# Patient Record
Sex: MALE | Race: WHITE | NOT HISPANIC OR LATINO | Employment: OTHER | URBAN - METROPOLITAN AREA
[De-identification: names, ages, dates, MRNs, and addresses within clinical notes are randomized per-mention and may not be internally consistent; named-entity substitution may affect disease eponyms.]

---

## 2017-01-03 ENCOUNTER — VIRTUAL VISIT (OUTPATIENT)
Dept: EDUCATION SERVICES | Facility: CLINIC | Age: 52
End: 2017-01-03

## 2017-01-03 DIAGNOSIS — E10.9 TYPE 1 DIABETES MELLITUS WITHOUT COMPLICATION (H): Primary | ICD-10-CM

## 2017-01-03 NOTE — PROGRESS NOTES
Pt had question regarding BG goals after meals.  Informed him ideal is under 140-160.  Will call if any other questions. Encouraged more activity to help with BG values. Nenita Mccarty RD, CDE

## 2017-02-09 ENCOUNTER — VIRTUAL VISIT (OUTPATIENT)
Dept: EDUCATION SERVICES | Facility: CLINIC | Age: 52
End: 2017-02-09

## 2017-02-09 DIAGNOSIS — E10.9 TYPE 1 DIABETES MELLITUS WITHOUT COMPLICATION (H): Primary | ICD-10-CM

## 2017-02-09 NOTE — PROGRESS NOTES
Nenita,  Good morning   I have been experiencing higher numbers in the am and am getting a little concerned. They are running 143 - 155 and I'm not eating before bed. Could you give me a call please [phone] and is it time to check my A1c ?  Thanks Bucky    He has not been eating as much for supper or having a snack.  I want him to try having a snack in evening and see if that helps his am numbers.  If not he will call his MD (non [facility]) and see if he can increase his Metformin.  He is currently on only 500 mg bid.  His last A1C he thinks was in November before he went off the insulin, he is curious to see if it is still in the 6's.  He will call if he has any other questions. Nenita Mccarty RD, CDE

## 2017-02-14 ENCOUNTER — COMMUNICATION - GICH (OUTPATIENT)
Dept: FAMILY MEDICINE | Facility: OTHER | Age: 52
End: 2017-02-14

## 2017-02-14 DIAGNOSIS — Z79.4 LONG TERM CURRENT USE OF INSULIN (H): ICD-10-CM

## 2017-02-14 DIAGNOSIS — E11.9 TYPE 2 DIABETES MELLITUS WITHOUT COMPLICATIONS (H): ICD-10-CM

## 2017-02-15 ENCOUNTER — OFFICE VISIT - GICH (OUTPATIENT)
Dept: FAMILY MEDICINE | Facility: OTHER | Age: 52
End: 2017-02-15

## 2017-02-15 DIAGNOSIS — E11.9 TYPE 2 DIABETES MELLITUS WITHOUT COMPLICATIONS (H): ICD-10-CM

## 2017-02-15 DIAGNOSIS — E66.9 OBESITY: ICD-10-CM

## 2017-02-15 LAB
ANION GAP - HISTORICAL: 10 (ref 5–18)
BUN SERPL-MCNC: 14 MG/DL (ref 7–25)
BUN/CREAT RATIO - HISTORICAL: 13
CALCIUM SERPL-MCNC: 9.7 MG/DL (ref 8.6–10.3)
CHLORIDE SERPLBLD-SCNC: 104 MMOL/L (ref 98–107)
CO2 SERPL-SCNC: 24 MMOL/L (ref 21–31)
CREAT SERPL-MCNC: 1.08 MG/DL (ref 0.7–1.3)
ESTIMATED AVERAGE GLUCOSE: 123 MG/DL
GFR IF NOT AFRICAN AMERICAN - HISTORICAL: >60 ML/MIN/1.73M2
GLUCOSE SERPL-MCNC: 153 MG/DL (ref 70–105)
HEMOGLOBIN A1C MONITORING (POCT) - HISTORICAL: 5.9 % (ref 4–6.2)
POTASSIUM SERPL-SCNC: 4.1 MMOL/L (ref 3.5–5.1)
SODIUM SERPL-SCNC: 138 MMOL/L (ref 133–143)

## 2017-02-16 LAB
ALB RAND URINE - HISTORICAL: 8.5 MG/L
CREATININE, URINE - HISTORICAL: 1.93 G/L
MICROALBUMIN, RAND UR - HISTORICAL: 4.4 MG/G CREAT

## 2017-02-20 ENCOUNTER — COMMUNICATION - GICH (OUTPATIENT)
Dept: FAMILY MEDICINE | Facility: OTHER | Age: 52
End: 2017-02-20

## 2017-04-14 ENCOUNTER — COMMUNICATION - GICH (OUTPATIENT)
Dept: FAMILY MEDICINE | Facility: OTHER | Age: 52
End: 2017-04-14

## 2017-04-14 DIAGNOSIS — E11.9 TYPE 2 DIABETES MELLITUS WITHOUT COMPLICATIONS (H): ICD-10-CM

## 2017-04-14 DIAGNOSIS — Z79.4 LONG TERM CURRENT USE OF INSULIN (H): ICD-10-CM

## 2017-08-29 ENCOUNTER — COMMUNICATION - GICH (OUTPATIENT)
Dept: FAMILY MEDICINE | Facility: OTHER | Age: 52
End: 2017-08-29

## 2017-08-29 DIAGNOSIS — E11.9 TYPE 2 DIABETES MELLITUS WITHOUT COMPLICATIONS (H): ICD-10-CM

## 2017-08-29 DIAGNOSIS — Z00.00 ENCOUNTER FOR GENERAL ADULT MEDICAL EXAMINATION WITHOUT ABNORMAL FINDINGS: ICD-10-CM

## 2017-08-31 ENCOUNTER — AMBULATORY - GICH (OUTPATIENT)
Dept: LAB | Facility: OTHER | Age: 52
End: 2017-08-31

## 2017-08-31 DIAGNOSIS — E11.9 TYPE 2 DIABETES MELLITUS WITHOUT COMPLICATIONS (H): ICD-10-CM

## 2017-08-31 DIAGNOSIS — Z00.00 ENCOUNTER FOR GENERAL ADULT MEDICAL EXAMINATION WITHOUT ABNORMAL FINDINGS: ICD-10-CM

## 2017-08-31 LAB
ANION GAP - HISTORICAL: 9 (ref 5–18)
BUN SERPL-MCNC: 15 MG/DL (ref 7–25)
BUN/CREAT RATIO - HISTORICAL: 14
CALCIUM SERPL-MCNC: 9.2 MG/DL (ref 8.6–10.3)
CHLORIDE SERPLBLD-SCNC: 104 MMOL/L (ref 98–107)
CHOL/HDL RATIO - HISTORICAL: 4.28
CHOLESTEROL TOTAL: 171 MG/DL
CO2 SERPL-SCNC: 24 MMOL/L (ref 21–31)
CREAT SERPL-MCNC: 1.1 MG/DL (ref 0.7–1.3)
ESTIMATED AVERAGE GLUCOSE: 137 MG/DL
GFR IF NOT AFRICAN AMERICAN - HISTORICAL: >60 ML/MIN/1.73M2
GLUCOSE SERPL-MCNC: 104 MG/DL (ref 70–105)
HDLC SERPL-MCNC: 40 MG/DL (ref 23–92)
HEMOGLOBIN A1C MONITORING (POCT) - HISTORICAL: 6.4 % (ref 4–6.2)
LDLC SERPL CALC-MCNC: 93 MG/DL
NON-HDL CHOLESTEROL - HISTORICAL: 131 MG/DL
PATIENT STATUS - HISTORICAL: ABNORMAL
POTASSIUM SERPL-SCNC: 4.1 MMOL/L (ref 3.5–5.1)
PSA TOTAL (DIAGNOSTIC) - HISTORICAL: 0.49 NG/ML
SODIUM SERPL-SCNC: 137 MMOL/L (ref 133–143)
TRIGL SERPL-MCNC: 191 MG/DL

## 2017-09-08 ENCOUNTER — COMMUNICATION - GICH (OUTPATIENT)
Dept: FAMILY MEDICINE | Facility: OTHER | Age: 52
End: 2017-09-08

## 2017-11-07 ENCOUNTER — COMMUNICATION - GICH (OUTPATIENT)
Dept: FAMILY MEDICINE | Facility: OTHER | Age: 52
End: 2017-11-07

## 2017-11-07 DIAGNOSIS — E11.9 TYPE 2 DIABETES MELLITUS WITHOUT COMPLICATIONS (H): ICD-10-CM

## 2017-11-07 DIAGNOSIS — Z79.4 LONG TERM CURRENT USE OF INSULIN (H): ICD-10-CM

## 2017-11-22 ENCOUNTER — OFFICE VISIT - GICH (OUTPATIENT)
Dept: FAMILY MEDICINE | Facility: OTHER | Age: 52
End: 2017-11-22

## 2017-11-22 ENCOUNTER — COMMUNICATION - GICH (OUTPATIENT)
Dept: FAMILY MEDICINE | Facility: OTHER | Age: 52
End: 2017-11-22

## 2017-11-22 ENCOUNTER — HISTORY (OUTPATIENT)
Dept: FAMILY MEDICINE | Facility: OTHER | Age: 52
End: 2017-11-22

## 2017-11-22 DIAGNOSIS — M79.674 PAIN OF TOE OF RIGHT FOOT: ICD-10-CM

## 2017-11-22 DIAGNOSIS — E11.9 TYPE 2 DIABETES MELLITUS WITHOUT COMPLICATIONS (H): ICD-10-CM

## 2017-11-22 DIAGNOSIS — Z79.4 LONG TERM CURRENT USE OF INSULIN (H): ICD-10-CM

## 2017-11-22 DIAGNOSIS — B35.3 TINEA PEDIS: ICD-10-CM

## 2017-11-22 LAB — TSH - HISTORICAL: 2.47 UIU/ML (ref 0.34–5.6)

## 2017-11-22 ASSESSMENT — PATIENT HEALTH QUESTIONNAIRE - PHQ9: SUM OF ALL RESPONSES TO PHQ QUESTIONS 1-9: 0

## 2017-12-22 ENCOUNTER — COMMUNICATION - GICH (OUTPATIENT)
Dept: PEDIATRICS | Facility: OTHER | Age: 52
End: 2017-12-22

## 2017-12-22 DIAGNOSIS — Z20.818 CONTACT WITH AND (SUSPECTED) EXPOSURE TO OTHER BACTERIAL COMMUNICABLE DISEASES: ICD-10-CM

## 2017-12-27 NOTE — PROGRESS NOTES
Patient Information     Patient Name MRN Sex     Bucky Chavez [id number] Male 1965      Progress Notes by Johnny Yoon MD at 2017  1:00 PM     Author:  Johnny Yoon MD  Service:  (none) Author Type:  Physician     Filed:  2017  8:24 AM  Encounter Date:  2017 Status:  Addendum     :  Johnny Yoon MD (Physician)        Related Notes: Original Note by Johnny Yoon MD (Physician) filed at 2017  8:00 AM            Nursing Notes:   Orenrrique Malissa CHENCHO  2017  1:35 PM  Signed  Patient presents today for follow up diabetic check. Patient is inquiring about different test strips.    Previous A1C is at goal of <8  HEMOGLOBIN A1C MONITORING (POCT) (%)    Date Value   2017 6.4 (H)     Urine microalbumin:creatine: 1.93, microalbumin urine rand 4.4  Foot exam 2016  Eye exam 2017    Patient is not a current smoker  Patient is on a daily aspirin  Patient is not on a Statin.  Blood pressure today of   is at the goal of <139/89 for diabetics.    Malissa Koch LPN..............2017 1:08 PM        SUBJECTIVE:  Bucky Chavez is a 52 y.o. Male.  Patient comes in today to follow-up on his diabetes. He reports things are going very well. The only medication that he is currently on is the metformin. He has not noticed any side effects. He has maintained a healthy weight. Continues to eat low glycemic diet. Has not had any issues with his eyes. No chest pain or shortness of breath. No anginal equivalents. Has not had any problems with foot pain.    Patient does report that he has had very dry skin. He has noticed on his arms but also on his legs.    He has had some thickening of the nails and tinea symptoms. No numbness or lesions. Also on his right great toe he has some soreness over the lateral nail fold. Has not had any purulence. Thinks it has been present for a couple of weeks.      Social History       Substance Use Topics          "Smoking status:   Never Smoker     Smokeless tobacco:   Never Used     Alcohol use   0.0 oz/week     0 Standard drinks or equivalent per week        Comment: Occasional        I have personally reviewed and updated above noted social, family and/or past medical history.    A comprehensive review of systems was negative except for items noted in HPI/Subjective.      OBJECTIVE:  /80  Pulse 60  Ht 1.84 m (6' 0.44\")  Wt 118.6 kg (261 lb 6.4 oz)  BMI 35.02 kg/m2  EXAM:  General Appearance: Pleasant, alert, appropriate appearance for age. No acute distress  Funduscopic Exam: no retinal or vascular abnormality  Thyroid Exam: No nodules or enlargement.  Chest/Respiratory Exam: Normal chest wall and respirations. Clear to auscultation.  Cardiovascular Exam: Regular rate and rhythm. S1, S2, no murmur, click, gallop, or rubs.  Gastrointestinal Exam: Soft, nontender, no abnormal masses or organomegaly.  Foot Exam: Left and right foot: monofilament sensation normal, good pedal pulses. Lateral nail fold red and inflamed. Currently no significant purulence.  Skin: Dry skin noted. He does have mild tinea of his feet as well as onychomycosis    ASSESSMENT/Plan :    I personally reviewed the patients' labs and imaging.    Results for orders placed or performed in visit on 11/22/17      TSH      Result  Value Ref Range    TSH 2.47 0.34 - 5.60 uIU/mL       Bucky was seen today for follow up.    Diagnoses and all orders for this visit:    Type 2 diabetes mellitus without complication, with long-term current use of insulin (HC)  patient is not quite yet due for a A1c. He is due anytime after December 1. We put in future labs. His last A1c was excellent. He needs a refill of his metformin. That was sent in for a years worth of refills. He also needs test strips. We will recheck his BMP as well to ensure he is not having any renal dysfunction. I discussed with patient potential benefits of starting ACE/ARB and statin. He would " like to hold off to see what his readings show as he has had significant reduction in his A1c despite elimination/reduction of his medications.  If A1c is still above 6.0 would suggest that minimum microalbumin testing if not initiation of ARB/statin.  -     Hgb A1c; Future  -     BASIC METABOLIC PANEL; Future  -     TSH; Future  -     blood sugar diagnostic (CONTOUR NEXT STRIPS) strip; Dispense item covered by pt ins. E11.9 NIDDM type II - Test 2 times/day. Reason: DM  -     metFORMIN (GLUCOPHAGE) 500 mg tablet; Take 1 tablet by mouth 2 times daily with meals.  -     TSH    Toe pain, right  I think this is early infection. We'll treat with a course of Keflex.  -     cephalexin (KEFLEX) 250 mg capsule; Take 1 capsule by mouth 4 times daily for 10 days.    Tinea pedis, unspecified laterality  Discussed with patient options for treating tinea and onychomycosis. He would like to avoid oral medications due to the small risk for liver issues. He continues Lamisil cream at nail bed and also can use it for topical tinea. If not improving may need to reconsider oral terbinafine.  -     terbinafine 1% cream (LAMISIL) 1 % cream; Apply  topically to affected area(s) 2 times daily.    Dry skin   patient does objectively have dry skin. We did check a TSH which returned normal. Suggest humidifying his bedroom.      There are no Patient Instructions on file for this visit.    Johnny Yoon MD    This document was created using computer generated templates and voice activated software.

## 2017-12-28 NOTE — ADDENDUM NOTE
Patient Information     Patient Name MRN Bucky Thompson [id number] Male 1965      Addendum Note by Lexus Dallas MD at 2017  8:24 AM     Author:  Lexus Dallas MD Service:  (none) Author Type:  Physician     Filed:  2017  8:24 AM Encounter Date:  2017 Status:  Signed     :  Lexus Dallas MD (Physician)       Addended by: LEXUS DALLAS on: 2017 08:24 AM        Modules accepted: Orders

## 2017-12-28 NOTE — TELEPHONE ENCOUNTER
Patient Information     Patient Name MRN Sex Bucky Yuen [id number] Male 1965      Telephone Encounter by Malissa Koch at 2017  3:58 PM     Author:  Malissa Koch Service:  (none) Author Type:  (none)     Filed:  2017  4:03 PM Encounter Date:  2017 Status:  Signed     :  Malissa Koch            Returned pharmacy call, and Dr. Yoon sent over the scrip they were looking for.    Malissa Koch LPN..............2017 4:03 PM

## 2017-12-30 NOTE — NURSING NOTE
Patient Information     Patient Name MRN Sex Bucky Yuen [id number] Male 1965      Nursing Note by Malissa Koch at 2017  1:00 PM     Author:  Malissa Koch Service:  (none) Author Type:  (none)     Filed:  2017  1:35 PM Encounter Date:  2017 Status:  Signed     :  Malissa Koch            Patient presents today for follow up diabetic check. Patient is inquiring about different test strips.    Previous A1C is at goal of <8  HEMOGLOBIN A1C MONITORING (POCT) (%)    Date Value   2017 6.4 (H)     Urine microalbumin:creatine: 1.93, microalbumin urine rand 4.4  Foot exam 2016  Eye exam 2017    Patient is not a current smoker  Patient is on a daily aspirin  Patient is not on a Statin.  Blood pressure today of   is at the goal of <139/89 for diabetics.    Malissa Koch LPN..............2017 1:08 PM

## 2018-01-03 NOTE — TELEPHONE ENCOUNTER
Patient Information     Patient Name MRN Sex Bucky Yuen [id number] Male 1965      Telephone Encounter by Selma Patel RN at 2017  1:43 PM     Author:  Selma Patel RN Service:  (none) Author Type:  NURS- Registered Nurse     Filed:  2017  1:55 PM Encounter Date:  2017 Status:  Signed     :  Selma Patel RN (NURS- Registered Nurse)            Biguanides    Office visit in the past 12 months or per provider note.    Last visit with BUCKY PETTY was on: 2016 in [facility]  Next visit with BUCKY PETTY is on: No future appointment listed with this provider  Next visit with Family Practice is on: 02/15/2017 in [facility]    Lab test requirements:  HgbA1c annually or per provider note.  HEMOGLOBIN A1C MONITORING (POCT) (%)    Date Value   2016 6.1       Max refill for 12 months from last office visit or per provider note.    If taking for polycystic ovary disease, may refill for 12 months.    Prescription refilled per RN Medication Refill Policy.................... Selma Patel RN ....................  2017   1:50 PM

## 2018-01-03 NOTE — PROGRESS NOTES
Patient Information     Patient Name MRN Sex Bucky Yuen [id number] Male 1965      Progress Notes by Johnny Yoon MD at 2/15/2017  1:30 PM     Author:  Johnny Yoon MD Service:  (none) Author Type:  Physician     Filed:  2017  7:03 AM Encounter Date:  2/15/2017 Status:  Signed     :  Johnny Yoon MD (Physician)            SUBJECTIVE:  Bucky Chavez is a 51 y.o. Male.  Patient comes in today to establish care. He tells me he was diagnosed with type 2 diabetes 3 years ago. He had very difficult to control blood sugars and quickly was started on insulin. Patient has been on an insulin pump for the past 2 years. During that time he did move from [facility] to our area. 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 Through this time he has lost 10-20 pounds.    Patient reports that his blood sugars seem to abruptly change shortly after being sick with some sort of a virus. He reports all over when they did testing it appeared that he did not have an elevated C-peptide or any evidence of type I or partial type 1 diabetes.    He reports over the past year having frequent low blood sugars. Readings will often be 70-80. This is especially problematic in the day when he was working outside. He often will disconnect the pump for several hours due to this as he reports reprogramming it tends to be cumbersome. He was recently in [place] vacationing when he jumped in the pool with his pump on. This caused permanent damage to the pump itself. He has now been off insulin for over one month. Blood sugar readings have been 105-120. The only diabetic medication has been taking his metformin 500 mg twice a day. He does have a prescription for Victoza but has not started yet.    He reports he actually feels much better since going off the pump. He is no longer having symptoms of malaise, muscle cramps and intermittent  "hypoglycemic symptoms.      Social History       Substance Use Topics         Smoking status:   Never Smoker     Smokeless tobacco:   Never Used     Alcohol use   0.0 oz/week      0 Standard drinks or equivalent per week         Comment: Occasional        I have personally reviewed and updated above noted social, family and/or past medical history.    A comprehensive review of systems was negative except for items noted in HPI/Subjective.      OBJECTIVE:  /70  Pulse 76  Ht 1.88 m (6' 2\")  Wt 122.5 kg (270 lb)  BMI 34.67 kg/m2  EXAM:  General Appearance: Pleasant, alert, appropriate appearance for age. No acute distress  Thyroid Exam: No nodules or enlargement.  Chest/Respiratory Exam: Normal chest wall and respirations. Clear to auscultation.  Cardiovascular Exam: Regular rate and rhythm. S1, S2, no murmur, click, gallop, or rubs.  Musculoskeletal Exam: No synovitis.  Muscle strength age and body habitus appropriate as well as equal and even.     ASSESSMENT/Plan :    I personally reviewed the patients' labs    Results for orders placed or performed in visit on 02/15/17      HEMOGLOBIN A1C MONITORING (POCT)      Result  Value Ref Range    HEMOGLOBIN A1C MONITORING (POCT) 5.9 4.0 - 6.2 %    ESTIMATED AVERAGE GLUCOSE  123 mg/dL   BASIC METABOLIC PANEL      Result  Value Ref Range    SODIUM 138 133 - 143 mmol/L    POTASSIUM 4.1 3.5 - 5.1 mmol/L    CHLORIDE 104 98 - 107 mmol/L    CO2,TOTAL 24 21 - 31 mmol/L    ANION GAP 10 5 - 18                    GLUCOSE 153 (H) 70 - 105 mg/dL    CALCIUM 9.7 8.6 - 10.3 mg/dL    BUN 14 7 - 25 mg/dL    CREATININE 1.08 0.70 - 1.30 mg/dL    BUN/CREAT RATIO           13                    GFR if African American >60 >60 ml/min/1.73m2    GFR if not African American >60 >60 ml/min/1.73m2       Bucky was seen today for diabetes.    Diagnoses and all orders for this visit:    Diabetes mellitus type 2 in obese (HC)  I discussed with patient that his history is really quite unusual. Sounds " as if he had relatively abrupt onset of poor insulin production. Likely had some degree of insulin resistance as he required quite high doses in a short time period of the NovoLog. Did well on the pump lately has been having symptoms of hypoglycemia and generalized malaise. I suspect he has been getting too much insulin as it sounds as if he is disconnecting it for large portions of the day when he is active. This may be in part due to weight loss and healthy changes in terms of what he is eating. Certainly there could be a immunogenic component with a infectious trigger but it is unusual that it seems that he is regaining improved glycemic control.    In any event his A1c returned at 5.8. At this level would definitely not suggest reinitiation of insulin pump. I would actually not suggest initiation of any additional medications. I would like him to remain on the metformin at current dosage. I would like him to focus on staying away from simple carbohydrates. Goal weight loss of 10-20 pounds over the next year.    We did check microalbumin which was normal.  Kidney function was normal. Nonfasting glucose today was elevated. He will need close monitoring as I think it is highly likely that he will develop worsening insulin function over the next several years and certainly weight loss can help greatly mitigate this by reducing insulin resistance.    Greater than 50% of this 25 minute visit was spent in counseling and coordination of care regarding diagnosis and treatment of unusual presentation of diabetes including review of chart does he was establishing care.      -     Hgb A1c; Future  -     BASIC METABOLIC PANEL; Future  -     MICROALBUMIN RANDOM URINE; Future  -     Hgb A1c  -     BASIC METABOLIC PANEL  -     MICROALBUMIN RANDOM URINE          There are no Patient Instructions on file for this visit.    Johnny Yoon MD    This document was created using computer generated templates and voice activated  software.

## 2018-01-03 NOTE — TELEPHONE ENCOUNTER
Patient Information     Patient Name MRN Sex Bucky Yuen [id number] Male 1965      Telephone Encounter by Madison Ceballos at 2017  2:00 PM     Author:  Madison Ceballos Service:  (none) Author Type:  (none)     Filed:  2017  2:08 PM Encounter Date:  2017 Status:  Signed     :  Madiosn Ceballos            Called Barb back, but explained that I did not have a release form signed to speak with her.  She gave me the number to contact the patient directly.  Madison Ceballos [facility] ([facility])................ 2017 2:07 PM

## 2018-01-03 NOTE — TELEPHONE ENCOUNTER
Patient Information     Patient Name MRN Sex Bucky Yuen [id number] Male 1965      Telephone Encounter by Madison Ceballos at 2017  2:07 PM     Author:  Madison Ceballos Service:  (none) Author Type:  (none)     Filed:  2017  2:08 PM Encounter Date:  2017 Status:  Signed     :  Madison Ceballos            After birth date was verified, patient was read the letter from Johnny Yoon MD.  Also explained that he will need to sign a consent form to give us permission to speak with Barb when he comes in again.  He was told to ask for this at that time.  Madison Ceballos CMA ([facility])................ 2017 2:08 PM

## 2018-01-04 NOTE — TELEPHONE ENCOUNTER
Patient Information     Patient Name MRN Sex Bucky Yuen [id number] Male 1965      Telephone Encounter by Selma Patel RN at 2017  9:13 AM     Author:  Selma Patel RN Service:  (none) Author Type:  NURS- Registered Nurse     Filed:  2017  9:15 AM Encounter Date:  2017 Status:  Signed     :  Selma Patel RN (NURS- Registered Nurse)            Redundant Refill Request refused;  Medication:metFORMIN (GLUCOPHAGE) 500 mg tablet  Prescription #:[id number]    Qty:180 tablet  Ref:2  Start:2017  Unable to complete prescription refill per RN Medication Refill Policy.................... Selma Patel RN ....................  2017   9:14 AM

## 2018-01-26 VITALS
WEIGHT: 261.4 LBS | SYSTOLIC BLOOD PRESSURE: 112 MMHG | BODY MASS INDEX: 35.41 KG/M2 | DIASTOLIC BLOOD PRESSURE: 80 MMHG | HEIGHT: 72 IN | HEART RATE: 60 BPM

## 2018-01-26 VITALS
HEIGHT: 74 IN | BODY MASS INDEX: 34.65 KG/M2 | DIASTOLIC BLOOD PRESSURE: 70 MMHG | WEIGHT: 270 LBS | HEART RATE: 76 BPM | SYSTOLIC BLOOD PRESSURE: 120 MMHG

## 2018-01-28 ASSESSMENT — PATIENT HEALTH QUESTIONNAIRE - PHQ9: SUM OF ALL RESPONSES TO PHQ QUESTIONS 1-9: 0

## 2018-02-12 NOTE — TELEPHONE ENCOUNTER
Patient Information     Patient Name MRN Bucky Thompson [id number] Male 1965      Telephone Encounter by Tanisha Gong MD at 2017  9:42 AM     Author:  Tanisha Gong MD Service:  (none) Author Type:  Physician     Filed:  2017  9:43 AM Encounter Date:  2017 Status:  Signed     :  Tanisha Gong MD (Physician)            Family member with positive pertussis.  Zithromax chemoprophylaxis.  Signed by Tanisha Gong MD .....2017 9:42 AM

## 2018-02-20 ENCOUNTER — DOCUMENTATION ONLY (OUTPATIENT)
Dept: FAMILY MEDICINE | Facility: OTHER | Age: 53
End: 2018-02-20

## 2018-02-20 RX ORDER — AMPICILLIN TRIHYDRATE 250 MG
500 CAPSULE ORAL DAILY
COMMUNITY
Start: 2014-04-09 | End: 2018-08-27

## 2018-02-20 RX ORDER — ASPIRIN 81 MG/1
81 TABLET ORAL
COMMUNITY
Start: 2016-03-22

## 2018-02-27 DIAGNOSIS — E11.9 DIABETES MELLITUS (H): Primary | ICD-10-CM

## 2018-02-27 DIAGNOSIS — E11.9 TYPE 2 DIABETES MELLITUS WITHOUT COMPLICATION, WITHOUT LONG-TERM CURRENT USE OF INSULIN (H): Primary | ICD-10-CM

## 2018-02-27 LAB
ANION GAP SERPL CALCULATED.3IONS-SCNC: 12 MMOL/L (ref 3–14)
BUN SERPL-MCNC: 15 MG/DL (ref 7–25)
CALCIUM SERPL-MCNC: 9.3 MG/DL (ref 8.6–10.3)
CHLORIDE SERPL-SCNC: 107 MMOL/L (ref 98–107)
CHOLEST SERPL-MCNC: 178 MG/DL
CO2 SERPL-SCNC: 21 MMOL/L (ref 21–31)
CREAT SERPL-MCNC: 1.04 MG/DL (ref 0.7–1.3)
GFR SERPL CREATININE-BSD FRML MDRD: 76 ML/MIN/1.7M2
GLUCOSE SERPL-MCNC: 116 MG/DL (ref 70–105)
HBA1C MFR BLD: 6.2 % (ref 4–6)
HDLC SERPL-MCNC: 49 MG/DL (ref 23–92)
LDLC SERPL CALC-MCNC: 102 MG/DL
NONHDLC SERPL-MCNC: 129 MG/DL
POTASSIUM SERPL-SCNC: 4.2 MMOL/L (ref 3.5–5.1)
SODIUM SERPL-SCNC: 140 MMOL/L (ref 134–144)
TRIGL SERPL-MCNC: 133 MG/DL

## 2018-02-27 PROCEDURE — 36415 COLL VENOUS BLD VENIPUNCTURE: CPT | Performed by: FAMILY MEDICINE

## 2018-02-27 PROCEDURE — 80061 LIPID PANEL: CPT | Performed by: FAMILY MEDICINE

## 2018-02-27 PROCEDURE — 83036 HEMOGLOBIN GLYCOSYLATED A1C: CPT | Performed by: FAMILY MEDICINE

## 2018-02-27 PROCEDURE — 80048 BASIC METABOLIC PNL TOTAL CA: CPT | Performed by: FAMILY MEDICINE

## 2018-07-23 NOTE — PROGRESS NOTES
"Patient Information     Patient Name  Bucky Chavez MRN  [id number] Sex  Male   1965      Letter by Johnny Yoon MD at      Author:  Johnny Yoon MD Service:  (none) Author Type:  (none)    Filed:   Encounter Date:  2/15/2017 Status:  (Other)         Bucky Chavez  [address] [facility] [address]    2017      Dear Mr. Chavez,      We've gotten results back from the laboratory for the samples you gave in clinic.  Things look great! Your A1c is just barely above \"normal\" into the prediabetic stage, but you are quite young so we don't want to be too lackadaisical on management.  I would definitely not suggest increasing your medications at this time.  For now, just stay on the 500mg metformin twice per day and keep focusing on eating healthy and getting regular exercise/activity.  I would like to recheck labs in 3-4 months unless you start having any concerning symptoms or blood sugar readings.  At that lab check we should also get a lipid panel and consider starting atorvastatin or rosuvastatin to reduce your cardiac risk depending on your lab results.  Contact us with any questions.    I'm sending along your actual numbers so that you will have them for your general interest and your records.       Take Care,   Johnny Yoon MD      Resulted Orders      HEMOGLOBIN A1C MONITORING (POCT) (Collected: 2/15/2017  1:58 PM)      Result  Value Ref Range    HEMOGLOBIN A1C MONITORING (POCT) 5.9 4.0 - 6.2 %    ESTIMATED AVERAGE GLUCOSE  123 mg/dL    Narrative           (<=6.9%)           Indicates good control       (7.0% to 7.9%)     Indicates fair control       (>=8.0%)           Indicates poor control       NOTE:  These thresholds are guidelines and            individual targets may vary.       BASIC METABOLIC PANEL (Collected: 2/15/2017  1:58 PM)      Result  Value Ref Range    SODIUM 138 133 - 143 mmol/L    POTASSIUM 4.1 3.5 - 5.1 mmol/L    CHLORIDE 104 98 - 107 mmol/L    " CO2,TOTAL 24 21 - 31 mmol/L    ANION GAP 10 5 - 18                    GLUCOSE 153 (H) 70 - 105 mg/dL    CALCIUM 9.7 8.6 - 10.3 mg/dL    BUN 14 7 - 25 mg/dL    CREATININE 1.08 0.70 - 1.30 mg/dL    BUN/CREAT RATIO           13                    GFR if African American >60 >60 ml/min/1.73m2    GFR if not African American >60 >60 ml/min/1.73m2   MICROALBUMIN RANDOM URINE (Collected: 2/15/2017  2:07 PM)      Result  Value Ref Range    ALB RAND URINE            8.5 mg/L    CREATININE,URINE          1.93 g/L    MICROALBUMIN,RAND UR      4.4 <30.0 mg/g creat    Narrative      Interpretation Note:       Elevations seen with incipient nephropathy associated     with diabetes mellitis or hypertension.  Stress, exercise,     hematuria, and urinary tract infection may also produce      elevated results.  If clinically indicated, confirm with      24 Hour Microalbumin.

## 2018-07-23 NOTE — PROGRESS NOTES
Patient Information     Patient Name  Bucky Chavez MRN  [id number] Sex  Male   1965      Letter by Johnny Yoon MD at      Author:  Johnny Yoon MD Service:  (none) Author Type:  (none)    Filed:   Encounter Date:  2017 Status:  (Other)         Bucky Chavez  [id number] [facility] [address]    10/12/2017      Dear Mr. Chavez,    Your lab results are listed below.  I was waiting to send the letter as my understanding was that you were going to schedule an appointment to go over the results.  Your A1c is excellent.  Your cholesterol is ok, your PSA is very normal.  I think there would be value in discussing your lab results in person and discussing a plan for future testing intervals. I also think there would be value in taking a statin medication but I recall that you are hesitant to do this and perhaps we could discuss the benefits and risks and you can make a decision based on that.      I'm sending along your actual numbers so that you will have them for your general interest and your records.       Take Care,   Johnny Yoon MD      Resulted Orders      PSA, TOTAL (Collected: 2017 11:40 AM)      Result  Value Ref Range    PSA TOTAL (DIAGNOSTIC) 0.487 <=3.100 ng/mL    Narrative      The percentage of Free PSA can be used to enhance the   differentiation of prostate cancer from benign prostatic  disease in subjects whose PSA levels are between 4.00 and  10.00 ng/mL.      The DXI PSA assay is a Chemiluminescent Assay.  Assay values obtained with different assay   methods cannot be used interchangeably due to differences  in assay methods and reagent specificity.       Hgb A1c (Collected: 2017 11:40 AM)      Result  Value Ref Range    HEMOGLOBIN A1C MONITORING (POCT) 6.4 (H) 4.0 - 6.2 %    ESTIMATED AVERAGE GLUCOSE  137 mg/dL    Narrative           (<=6.9%)           Indicates good control       (7.0% to 7.9%)     Indicates fair control       (>=8.0%)            Indicates poor control       NOTE:  These thresholds are guidelines and            individual targets may vary.       LIPID PANEL (Collected: 8/31/2017 11:40 AM)      Result  Value Ref Range    CHOLESTEROL,TOTAL 171 <200 mg/dL    TRIGLYCERIDES 191 (H) <150 mg/dL    HDL CHOLESTEROL 40 23 - 92 mg/dL    NON-HDL CHOLESTEROL 131 <145 mg/dl    CHOL/HDL RATIO            4.28 <4.50                    LDL CHOLESTEROL 93 <100 mg/dL    PATIENT STATUS            FASTING                   BASIC METABOLIC PANEL (Collected: 8/31/2017 11:40 AM)      Result  Value Ref Range    SODIUM 137 133 - 143 mmol/L    POTASSIUM 4.1 3.5 - 5.1 mmol/L    CHLORIDE 104 98 - 107 mmol/L    CO2,TOTAL 24 21 - 31 mmol/L    ANION GAP 9 5 - 18                    GLUCOSE 104 70 - 105 mg/dL    CALCIUM 9.2 8.6 - 10.3 mg/dL    BUN 15 7 - 25 mg/dL    CREATININE 1.10 0.70 - 1.30 mg/dL    BUN/CREAT RATIO           14                    GFR if African American >60 >60 ml/min/1.73m2    GFR if not African American >60 >60 ml/min/1.73m2

## 2018-08-27 ENCOUNTER — OFFICE VISIT (OUTPATIENT)
Dept: FAMILY MEDICINE | Facility: OTHER | Age: 53
End: 2018-08-27
Attending: CHIROPRACTOR
Payer: OTHER MISCELLANEOUS

## 2018-08-27 ENCOUNTER — HOSPITAL ENCOUNTER (OUTPATIENT)
Dept: GENERAL RADIOLOGY | Facility: OTHER | Age: 53
Discharge: HOME | End: 2018-08-27
Attending: CHIROPRACTOR | Admitting: CHIROPRACTOR
Payer: OTHER MISCELLANEOUS

## 2018-08-27 VITALS
SYSTOLIC BLOOD PRESSURE: 120 MMHG | RESPIRATION RATE: 12 BRPM | HEIGHT: 73 IN | BODY MASS INDEX: 34.99 KG/M2 | TEMPERATURE: 98 F | WEIGHT: 264 LBS | HEART RATE: 60 BPM | DIASTOLIC BLOOD PRESSURE: 80 MMHG

## 2018-08-27 DIAGNOSIS — M54.40 BILATERAL LOW BACK PAIN WITH SCIATICA, SCIATICA LATERALITY UNSPECIFIED, UNSPECIFIED CHRONICITY: Primary | ICD-10-CM

## 2018-08-27 DIAGNOSIS — M54.40 BILATERAL LOW BACK PAIN WITH SCIATICA, SCIATICA LATERALITY UNSPECIFIED, UNSPECIFIED CHRONICITY: ICD-10-CM

## 2018-08-27 PROCEDURE — 72110 X-RAY EXAM L-2 SPINE 4/>VWS: CPT

## 2018-08-27 PROCEDURE — 99213 OFFICE O/P EST LOW 20 MIN: CPT | Performed by: CHIROPRACTOR

## 2018-08-27 ASSESSMENT — PAIN SCALES - GENERAL: PAINLEVEL: EXTREME PAIN (8)

## 2018-08-27 NOTE — MR AVS SNAPSHOT
After Visit Summary   8/27/2018    Bucky Chavez    MRN: [id number]           Patient Information     Date Of Birth          1965        Visit Information        Provider Department      8/27/2018 8:40 AM Garrett Guerrero DC [facility]        Today's Diagnoses     Bilateral low back pain with sciatica, sciatica laterality unspecified, unspecified chronicity    -  1       Follow-ups after your visit        Additional Services     CHIROPRACTIC REFERRAL       Please call prior to tx                  Follow-up notes from your care team     Return in about 6 weeks (around 10/8/2018).      Your next 10 appointments already scheduled     Aug 27, 2018  1:30 PM CDT   ANN Chiropractor with Luis Martinez DC   [facility] ([facility])    [address]  [place] [address]   [phone]            Oct 08, 2018  8:00 AM CDT   SHORT with Garrett Guerrero DC   [facility]    [address]  [facility] [address]   [phone]              Future tests that were ordered for you today     Open Future Orders        Priority Expected Expires Ordered    XR Lumbar Spine G/E 4 Views Routine 8/27/2018 8/27/2019 8/27/2018            Who to contact     If you have questions or need follow up information about today's clinic visit or your schedule please contact [facility] directly at [phone].  Normal or non-critical lab and imaging results will be communicated to you by MyChart, letter or phone within 4 business days after the clinic has received the results. If you do not hear from us within 7 days, please contact the clinic through MyChart or phone. If you have a critical or abnormal lab result, we will notify you by phone as soon as possible.  Submit refill requests through [employer] or call your pharmacy and they will forward the refill request to us. Please allow  "3 business days for your refill to be completed.          Additional Information About Your Visit        Care EveryWhere ID     This is your Care EveryWhere ID. This could be used by other organizations to access your [place] medical records  [id number]        Your Vitals Were     Pulse Temperature Respirations Height BMI (Body Mass Index)       60 98  F (36.7  C) (Tympanic) 12 6' 1\" (1.854 m) 34.83 kg/m2        Blood Pressure from Last 3 Encounters:   08/27/18 120/80   11/22/17 112/80   02/15/17 120/70    Weight from Last 3 Encounters:   08/27/18 264 lb (119.7 kg)   11/22/17 261 lb 6.4 oz (118.6 kg)   02/15/17 270 lb (122.5 kg)              We Performed the Following     CHIROPRACTIC REFERRAL          Today's Medication Changes          These changes are accurate as of 8/27/18 10:24 AM.  If you have any questions, ask your nurse or doctor.               These medicines have changed or have updated prescriptions.        Dose/Directions    POLLO CONTOUR NEXT test strip   This may have changed:  Another medication with the same name was removed. Continue taking this medication, and follow the directions you see here.   Generic drug:  blood glucose monitoring   Changed by:  Garrett Guerrero, ROBEL        Dispense item covered by pt ins. E11.9 NIDDM type II - Test 2 times/day. Reason: DM   Refills:  0                Primary Care Provider Office Phone # Fax #    Johnny Yoon [phone] [address] [facility] [address]        Equal Access to Services     [facility] Center[facility]ANESSA : Hadii aad ku hadasho Soomaali, waaxda luqadaha, qaybta kaalmada adeegyada, rahel hazel. So [facility] [phone].    ATENCIÓN: Si habla español, tiene a ortiz disposición servicios gratuitos de asistencia lingüística. Llame al [phone].    We comply with applicable federal civil rights laws and [place] laws. We do not discriminate on the basis of race, color, national origin, age, disability, " sex, sexual orientation, or gender identity.            Thank you!     Thank you for choosing [facility] [place]  for your care. Our goal is always to provide you with excellent care. Hearing back from our patients is one way we can continue to improve our services. Please take a few minutes to complete the written survey that you may receive in the mail after your visit with us. Thank you!             Your Updated Medication List - Protect others around you: Learn how to safely use, store and throw away your medicines at www.disposemymeds.org.          This list is accurate as of 8/27/18 10:24 AM.  Always use your most recent med list.                   Brand Name Dispense Instructions for use Diagnosis    ACE NOT PRESCRIBED (INTENTIONAL)     0 each    continuous prn. ACE Inhibitor not prescribed due to {CHOOSE REASON BEFORE SIGNING!!!:600212}    Type 1 diabetes, HbA1c goal < 8% (H)       aspirin 81 MG EC tablet      Take 81 mg by mouth every morning (before breakfast)        POLLO CONTOUR NEXT test strip   Generic drug:  blood glucose monitoring      Dispense item covered by pt ins. E11.9 NIDDM type II - Test 2 times/day. Reason: DM        metFORMIN 500 MG tablet    GLUCOPHAGE     Take 500 mg by mouth 2 times daily (with meals)

## 2018-08-27 NOTE — PROGRESS NOTES
"Bucky Chavez was seen for low back pain condition that he states is related to the work comp case dating back to 2010.    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  Patient indicates he was attempting to perform a lift and with his arms reaching out felt his lower back \"give out\".  He states this is occurring approximately 1 time per year ever since original 2010 injury.  He reports that 2010 injury is being an injury as a result of moving a \"bay\" while working for [employer].  He did see chiropractic treatment for this injury which is ultimately denied due to no progression of improvement.  Patient also describes a work-related injury occurring in 2012 to the same region.  At this injury, he was reaching out with a cable and this produced symptoms were similar to the original 2010 injury.  Again, patient sought chiropractic care which included a series of traction treatments, physical modalities, and adjustments.  No significant progress was made, though he indicated it did help.  Again, pain is bandlike and not radiating to either leg.  He states he feels no weakness when walking.  Painful conditions include getting up from a seated position and most movements in general.  Description of pain: Sharp  Pain scale: 7/10  Radiation of pain: No.  Pain course: Staying the same  Aggravated by: Movement  Improved by: Resting      PAST MEDICAL HISTORY:  Past Medical History:   Diagnosis Date     Type 2 diabetes mellitus without complications (H)     No Comments Provided       PAST SURGICAL HISTORY:  Past Surgical History:   Procedure Laterality Date     APPENDECTOMY OPEN      2000 " "    OTHER SURGICAL HISTORY      [id number],FOOT SURGERY,Big toe       ALLERGIES:  Allergies   Allergen Reactions     Penicillins Rash and Hives     As a child     Seasonal Allergies        SOCIAL HISTORY:  Marital Status: .  Children: Unremarkable  Occupation: Self-employed farmer.  Alcohol use: Intact  Tobacco use: No  Are you or have you used illicit drugs: Unremarkable    FAMILY HISTORY:  Family History   Problem Relation Age of Onset     Cancer Maternal Grandmother      Cancer,lung smoker       REVIEW OF SYSTEMS:  The review of systems as documented in the HPI and on the intake questionnaire, completed by the patient on 8/27/2018 have been reviewed. Positives and negatives on the questionnaire were addressed.  The remainder of the complete review of systems was non-contributory.    5 minutes spent with chart review.    PHYSICAL EXAM:   /80 (BP Location: Right arm, Patient Position: Sitting, Cuff Size: Adult Large)  Pulse 60  Temp 98  F (36.7  C) (Tympanic)  Resp 12  Ht 6' 1\" (1.854 m)  Wt 264 lb (119.7 kg)  BMI 34.83 kg/m2 Body mass index is 34.83 kg/(m^2).    General Appearance: Pleasant, alert, In acute distress.  Antalgic to the right    Patient is ambulatory without assistance but his movements are guarded and slow.    Head Exam: Normal. Normocephalic, Atraumatic  Eyes: Pupils are equal, round, and reactive to light. Extraocular movements intact.  Oropharynx: Normal buccal mucosa. Normal pharynx.  Ears: Normal TM's bilaterally. Normal auditory canals and external ears  Neck: Supple, no masses or nodes, no lymphadenopathy, no thyromegaly  Lungs: Normal chest wall expansion and respirations. Ausculation is clear.  Cardiovascular: Regular rate and rhythm, S1, S2, no murmurs  Gastrointestinal: Soft, nontender, no abnormal masses or organomegaly. Bowel sounds are normal.  Skin: no rashes, abrasions, infections  Neurologic Exam: Cranial Nerves 2 through 12 grossly intact.  Normal gait.  Symmetrical " DTR's, normal gross motor movement, tone and coordination.  No tremors.   Psychiatric Exam: alert, orientated and appropriate affect.    Lumbar Spine:  Range of motion using Dual Inclinometers:   Flexion (40-60): Unable to perform due to pain   Extension (20-35): Unable to perform due to pain   Right Lateral Flexion (15-20): Unable to perform due to pain   Left Lateral Flexion (15-20): Unable to perform due to pain  Myotomes:   L2: Hip Flexion: 4   L3: Knee Extension: 5   L4: Ankle Dorsiflexion: 5   L5: Great Toe Extension: 5   S1: Ankle Plantar Flexion, Ankle Eversion, Hip Extension: 4   S2: Knee Flexion: 5  Reflexes:   L3-L4: Patellar: +1 on the right, +2 on the left   S1-S2: Achilles: +1 bilaterally  Straight Leg Raiser Test: positive bilaterally at 45   Kemps Test: positive extension bilaterally  Ely's Test: positive on the right  Nachlas Test: positive on the left  Yeoman's Test:  negative  Hibb's Test: negative   Sensory is: Intact  Circulatory: Normal capillary flow    Moderate hamstring tightness noted bilaterally    Segmental dysfunction with hypolordosis is noted in the lumbar spine.    Ordered Lumbar x-ray - 5 view including Obliques. Radiograph images were independently reviewed with the patient for 15 minutes.  Adequate disc space height is seen.       IMPRESSION:    (1) Segmental dysfunction with associated lumbar hypolordosis and discitis  (2) Excessive hamstring tightness    PLAN:    Risks, benefits, conservative, surgical and alternatives to treatment were discussed.    Referral made to Dr. Martinez for traction based treatments, hamstring muscle stretching, segmental joint dysfunction correction. Icing and stretching at home is advised.  F/u in 6 weeks for re-evaluation.      Post Encounter: No further questions and all concerns answered to their satisfaction.      Garrett Guerrero DC  Director - Occupational Medicine Department  [address]  [address]  Phone [phone]  Fax [phone]    Disclaimer:  This note consists of words and symbols derived from keyboarding, dictation, or using voice recognition software. As a result, there may be errors in the script that have gone undetected. Please consider this when interpreting information found in this note.    9:46 AM 8/27/2018

## 2018-08-27 NOTE — NURSING NOTE
Patient presenting today with back pain. States that he aggravated old work comp back injury from 8/26/10 by reaching out and pulling back on a square bale of hay at home.  Medication Reconciliation: complete      Clotilde Lucas LPN 8/27/2018 8:59 AM

## 2018-11-06 ENCOUNTER — OFFICE VISIT (OUTPATIENT)
Dept: FAMILY MEDICINE | Facility: OTHER | Age: 53
End: 2018-11-06
Attending: FAMILY MEDICINE
Payer: COMMERCIAL

## 2018-11-06 ENCOUNTER — MEDICAL CORRESPONDENCE (OUTPATIENT)
Dept: HEALTH INFORMATION MANAGEMENT | Facility: OTHER | Age: 53
End: 2018-11-06

## 2018-11-06 VITALS
WEIGHT: 259 LBS | RESPIRATION RATE: 18 BRPM | HEART RATE: 64 BPM | HEIGHT: 73 IN | TEMPERATURE: 97.9 F | SYSTOLIC BLOOD PRESSURE: 120 MMHG | BODY MASS INDEX: 34.33 KG/M2 | DIASTOLIC BLOOD PRESSURE: 80 MMHG

## 2018-11-06 DIAGNOSIS — E78.5 HYPERLIPIDEMIA LDL GOAL <100: Primary | ICD-10-CM

## 2018-11-06 DIAGNOSIS — Z12.11 SPECIAL SCREENING FOR MALIGNANT NEOPLASMS, COLON: ICD-10-CM

## 2018-11-06 DIAGNOSIS — G25.81 RESTLESS LEGS SYNDROME (RLS): ICD-10-CM

## 2018-11-06 DIAGNOSIS — Z82.49 FAMILY HISTORY OF ISCHEMIC HEART DISEASE: ICD-10-CM

## 2018-11-06 DIAGNOSIS — E11.9 TYPE 2 DIABETES MELLITUS WITHOUT COMPLICATION, WITHOUT LONG-TERM CURRENT USE OF INSULIN (H): ICD-10-CM

## 2018-11-06 DIAGNOSIS — Z12.5 SCREENING FOR PROSTATE CANCER: ICD-10-CM

## 2018-11-06 DIAGNOSIS — Z23 NEED FOR PROPHYLACTIC VACCINATION WITH COMBINED DIPHTHERIA-TETANUS-PERTUSSIS (DTAP) VACCINE: ICD-10-CM

## 2018-11-06 LAB
ALBUMIN SERPL-MCNC: 4.4 G/DL (ref 3.5–5.7)
ALP SERPL-CCNC: 37 U/L (ref 34–104)
ALT SERPL W P-5'-P-CCNC: 41 U/L (ref 7–52)
ANION GAP SERPL CALCULATED.3IONS-SCNC: 8 MMOL/L (ref 3–14)
AST SERPL W P-5'-P-CCNC: 23 U/L (ref 13–39)
BILIRUB SERPL-MCNC: 1 MG/DL (ref 0.3–1)
BUN SERPL-MCNC: 17 MG/DL (ref 7–25)
CALCIUM SERPL-MCNC: 9.2 MG/DL (ref 8.6–10.3)
CHLORIDE SERPL-SCNC: 107 MMOL/L (ref 98–107)
CHOLEST SERPL-MCNC: 179 MG/DL
CO2 SERPL-SCNC: 24 MMOL/L (ref 21–31)
CREAT SERPL-MCNC: 1 MG/DL (ref 0.7–1.3)
GFR SERPL CREATININE-BSD FRML MDRD: 79 ML/MIN/1.7M2
GLUCOSE SERPL-MCNC: 116 MG/DL (ref 70–105)
HBA1C MFR BLD: 6.4 % (ref 4–6)
HDLC SERPL-MCNC: 56 MG/DL (ref 23–92)
LDLC SERPL CALC-MCNC: 106 MG/DL
NONHDLC SERPL-MCNC: 123 MG/DL
POTASSIUM SERPL-SCNC: 4.3 MMOL/L (ref 3.5–5.1)
PROT SERPL-MCNC: 7.1 G/DL (ref 6.4–8.9)
PSA SERPL-ACNC: 0.43 NG/ML
SODIUM SERPL-SCNC: 139 MMOL/L (ref 134–144)
TRIGL SERPL-MCNC: 83 MG/DL

## 2018-11-06 PROCEDURE — 80053 COMPREHEN METABOLIC PANEL: CPT | Performed by: FAMILY MEDICINE

## 2018-11-06 PROCEDURE — 36415 COLL VENOUS BLD VENIPUNCTURE: CPT | Performed by: FAMILY MEDICINE

## 2018-11-06 PROCEDURE — 90472 IMMUNIZATION ADMIN EACH ADD: CPT

## 2018-11-06 PROCEDURE — G0463 HOSPITAL OUTPT CLINIC VISIT: HCPCS

## 2018-11-06 PROCEDURE — G0463 HOSPITAL OUTPT CLINIC VISIT: HCPCS | Mod: 25

## 2018-11-06 PROCEDURE — 83036 HEMOGLOBIN GLYCOSYLATED A1C: CPT | Performed by: FAMILY MEDICINE

## 2018-11-06 PROCEDURE — G0103 PSA SCREENING: HCPCS | Performed by: FAMILY MEDICINE

## 2018-11-06 PROCEDURE — 90715 TDAP VACCINE 7 YRS/> IM: CPT | Performed by: FAMILY MEDICINE

## 2018-11-06 PROCEDURE — 99214 OFFICE O/P EST MOD 30 MIN: CPT | Performed by: FAMILY MEDICINE

## 2018-11-06 PROCEDURE — 80061 LIPID PANEL: CPT | Performed by: FAMILY MEDICINE

## 2018-11-06 RX ORDER — ROSUVASTATIN CALCIUM 5 MG/1
5 TABLET, COATED ORAL DAILY
Qty: 90 TABLET | Refills: 3 | Status: SHIPPED | OUTPATIENT
Start: 2018-11-06 | End: 2019-05-07

## 2018-11-06 RX ORDER — PRAMIPEXOLE DIHYDROCHLORIDE 0.12 MG/1
0.12 TABLET ORAL AT BEDTIME
Qty: 90 TABLET | Refills: 3 | Status: SHIPPED | OUTPATIENT
Start: 2018-11-06 | End: 2019-05-07

## 2018-11-06 NOTE — NURSING NOTE
Previous A1C is at goal of <8  Lab Results   Component Value Date    A1C 6.2 02/27/2018    A1C 5.5 12/13/2013    A1C 5.6 03/08/2013    A1C 7.1 12/13/2012    A1C 8.5 11/20/2012     Urine microalbumin:creatine: Provider's discretion  Foot exam Today  Eye exam June 2018    Tobacco User No  Patient is on a daily aspirin  Patient is not on a Statin.  Blood pressure today of:     BP Readings from Last 1 Encounters:   11/06/18 120/80      is at the goal of <139/89 for diabetics.    Geena Hirsch LPN on 11/6/2018 at 9:42 AM

## 2018-11-06 NOTE — Clinical Note
Please let patient know that I accidentally did not enter three labs I wanted to get for his arm/leg symptoms.  If he would kindly come back in to have those checked I would appreciate it.  Lab visit only.

## 2018-11-06 NOTE — PATIENT INSTRUCTIONS
First try would be a medicine for restless legs - ramiprexole..  Second choice would be Gabapentin (Neurontin) which could help if it is diabetic neuropathy or RLS.  If that gives side effects we can try Lyrica (gabapentin).  Crestor at 5mg should be started once you are stable in terms of the new medications above.

## 2018-11-06 NOTE — NURSING NOTE
No LMP for male patient.  Medication Reconciliation: complete    Geena Hirsch LPN  11/6/2018 9:43 AM

## 2018-11-06 NOTE — LETTER
November 7, 2018      Bucky Chavez  [address] [address] [address]        Dear ,    We are writing to inform you of your test results.    Labs are all stable, I would suggest the crestor as we discussed once we have stabilized you on the medication to help with the leg pain.  Your PSA is quite reassuring.    Resulted Orders   Hemoglobin A1c   Result Value Ref Range    Hemoglobin A1C 6.4 (H) 4.0 - 6.0 %   Comprehensive Metabolic Panel   Result Value Ref Range    Sodium 139 134 - 144 mmol/L    Potassium 4.3 3.5 - 5.1 mmol/L    Chloride 107 98 - 107 mmol/L    Carbon Dioxide 24 21 - 31 mmol/L    Anion Gap 8 3 - 14 mmol/L    Glucose 116 (H) 70 - 105 mg/dL    Urea Nitrogen 17 7 - 25 mg/dL    Creatinine 1.00 0.70 - 1.30 mg/dL    GFR Estimate 79 >60 mL/min/1.7m2    GFR Estimate If Black >90 >60 mL/min/1.7m2    Calcium 9.2 8.6 - 10.3 mg/dL    Bilirubin Total 1.0 0.3 - 1.0 mg/dL    Albumin 4.4 3.5 - 5.7 g/dL    Protein Total 7.1 6.4 - 8.9 g/dL    Alkaline Phosphatase 37 34 - 104 U/L    ALT 41 7 - 52 U/L    AST 23 13 - 39 U/L   PSA Screen GH   Result Value Ref Range    PSA Screen 0.433 <3.100 ng/mL   Lipid Panel   Result Value Ref Range    Cholesterol 179 <200 mg/dL    Triglycerides 83 <150 mg/dL    HDL Cholesterol 56 23 - 92 mg/dL    LDL Cholesterol Calculated 106 (H) <100 mg/dL      Comment:      Above desirable:  100-129 mg/dl  Borderline High:  130-159 mg/dL  High:             160-189 mg/dL  Very high:       >189 mg/dl      Non HDL Cholesterol 123 <130 mg/dL       If you have any questions or concerns, please call the clinic at the number listed above.       Sincerely,        Johnny Yoon MD

## 2018-11-06 NOTE — MR AVS SNAPSHOT
After Visit Summary   11/6/2018    Bucky Chavez    MRN: [id number]           Patient Information     Date Of Birth          1965        Visit Information        Provider Department      11/6/2018 9:30 AM Johnny Yoon MD [facility]        Today's Diagnoses     Hyperlipidemia LDL goal <100    -  1    Screening for prostate cancer        Type 2 diabetes mellitus without complication, without long-term current use of insulin (H)        Restless legs syndrome (RLS)        FAMILY HISTORY OF CARDIOVASC DIS (ISCHEMIC)        Special screening for malignant neoplasms, colon          Care Instructions    First try would be a medicine for restless legs - ramiprexole..  Second choice would be Gabapentin (Neurontin) which could help if it is diabetic neuropathy or RLS.  If that gives side effects we can try Lyrica (gabapentin).  Crestor at 5mg should be started once you are stable in terms of the new medications above.          Follow-ups after your visit        Who to contact     If you have questions or need follow up information about today's clinic visit or your schedule please contact [facility] directly at [phone].  Normal or non-critical lab and imaging results will be communicated to you by MyChart, letter or phone within 4 business days after the clinic has received the results. If you do not hear from us within 7 days, please contact the clinic through MyChart or phone. If you have a critical or abnormal lab result, we will notify you by phone as soon as possible.  Submit refill requests through [employer] or call your pharmacy and they will forward the refill request to us. Please allow 3 business days for your refill to be completed.          Additional Information About Your Visit        Care EveryWhere ID     This is your Care EveryWhere ID. This could be used by other organizations to access your [place] medical records  [id number]        Your Vitals Were   "   Pulse Temperature Respirations Height BMI (Body Mass Index)       64 97.9  F (36.6  C) 18 6' 0.75\" (1.848 m) 34.41 kg/m2        Blood Pressure from Last 3 Encounters:   11/06/18 120/80   08/27/18 120/80   11/22/17 112/80    Weight from Last 3 Encounters:   11/06/18 259 lb (117.5 kg)   08/27/18 264 lb (119.7 kg)   11/22/17 261 lb 6.4 oz (118.6 kg)              We Performed the Following     Comprehensive Metabolic Panel     Hemoglobin A1c     Lipid Panel     PSA Screen GH          Today's Medication Changes          These changes are accurate as of 11/6/18 10:42 AM.  If you have any questions, ask your nurse or doctor.               Start taking these medicines.        Dose/Directions    pramipexole 0.125 MG tablet   Commonly known as:  MIRAPEX   Used for:  Restless legs syndrome (RLS)   Started by:  Johnny Yoon MD        Dose:  0.125 mg   Take 1 tablet (0.125 mg) by mouth At Bedtime   Quantity:  90 tablet   Refills:  3       rosuvastatin 5 MG tablet   Commonly known as:  CRESTOR   Used for:  Type 2 diabetes mellitus without complication, without long-term current use of insulin (H)   Started by:  Johnny Yoon MD        Dose:  5 mg   Take 1 tablet (5 mg) by mouth daily   Quantity:  90 tablet   Refills:  3         Stop taking these medicines if you haven't already. Please contact your care team if you have questions.     ACE NOT PRESCRIBED (INTENTIONAL)   Stopped by:  Johnny Yoon MD                Where to get your medicines      These medications were sent to [facility] [address] [address]     Phone:  [phone]     metFORMIN 500 MG tablet    pramipexole 0.125 MG tablet    rosuvastatin 5 MG tablet                Primary Care Provider Office Phone # Fax #    Johnny Yoon [phone] [address] [facility] [address]        Equal Access to Services     DHIRAJ SWANN AH: Hadii " fatemeh Phan, waramírezda luduniaadaha, qaybta kaaldonald janakalo, waxyousif sherry lozanoflorinjose ware yasemin. So [facility] [phone].    ATENCIÓN: Si habla thais, tiene a ortiz disposición servicios gratuitos de asistencia lingüística. Llame al [phone].    We comply with applicable federal civil rights laws and [place] laws. We do not discriminate on the basis of race, color, national origin, age, disability, sex, sexual orientation, or gender identity.            Thank you!     Thank you for choosing [facility]  for your care. Our goal is always to provide you with excellent care. Hearing back from our patients is one way we can continue to improve our services. Please take a few minutes to complete the written survey that you may receive in the mail after your visit with us. Thank you!             Your Updated Medication List - Protect others around you: Learn how to safely use, store and throw away your medicines at www.disposemymeds.org.          This list is accurate as of 11/6/18 10:42 AM.  Always use your most recent med list.                   Brand Name Dispense Instructions for use Diagnosis    aspirin 81 MG EC tablet      Take 81 mg by mouth every morning (before breakfast)        POLLO CONTOUR NEXT test strip   Generic drug:  blood glucose monitoring      Dispense item covered by pt ins. E11.9 NIDDM type II - Test 2 times/day. Reason: DM        metFORMIN 500 MG tablet    GLUCOPHAGE    180 tablet    Take 1 tablet (500 mg) by mouth 2 times daily (with meals)    Type 2 diabetes mellitus without complication, without long-term current use of insulin (H)       pramipexole 0.125 MG tablet    MIRAPEX    90 tablet    Take 1 tablet (0.125 mg) by mouth At Bedtime    Restless legs syndrome (RLS)       rosuvastatin 5 MG tablet    CRESTOR    90 tablet    Take 1 tablet (5 mg) by mouth daily    Type 2 diabetes mellitus without complication, without long-term current use of insulin (H)

## 2018-11-08 NOTE — PROGRESS NOTES
Nursing Notes:   Geena Hirsch LPN  11/6/2018  9:44 AM  Signed  Previous A1C is at goal of <8  Lab Results   Component Value Date    A1C 6.2 02/27/2018    A1C 5.5 12/13/2013    A1C 5.6 03/08/2013    A1C 7.1 12/13/2012    A1C 8.5 11/20/2012     Urine microalbumin:creatine: Provider's discretion  Foot exam Today  Eye exam June 2018    Tobacco User No  Patient is on a daily aspirin  Patient is not on a Statin.  Blood pressure today of:     BP Readings from Last 1 Encounters:   11/06/18 120/80      is at the goal of <139/89 for diabetics.    Geena Hirsch LPN on 11/6/2018 at 9:42 AM        Geena Hirsch LPN  11/6/2018  9:44 AM  Signed  No LMP for male patient.  Medication Reconciliation: complete    Geena Hirsch LPN  11/6/2018 9:43 AM        SUBJECTIVE:  Bucky Chavez is a 53 year old male who comes in today with his wife for diabetic checkup.  Patient was originally diagnosed with type 1 diabetes mellitus and started on insulin.  He was on insulin pump.  He did make significant lifestyle changes however in terms of what he eats and he is now no longer on insulin with excellent glycemic control.  He reports occasionally he has had some blurred vision in his right eye but it is not been persistent and he has not had any visual field cuts.  He is due for an eye exam.  Patient denies any chest pain, shortness of breath or other anginal equivalents.    He does report some issues with his legs and arms.  Reports that at times he can feel stiff and tight and occasionally has some soreness in his legs and arms.  Also reports that this may be muscle cramping.  Does not seem to be localized to proximal muscles and does not really have weakness.  Thinks perhaps he has restless legs and notes that symptoms are definitely worse at night when he is trying to sleep.  Really does not have much during the day.      Social History     Social History     Marital status:      Spouse name: N/A     Number of children:  "N/A     Years of education: N/A     Occupational History     Not on file.     Social History Main Topics     Smoking status: Never Smoker     Smokeless tobacco: Never Used     Alcohol use 0.0 oz/week      Comment: Alcoholic Drinks/day: Occasional     Drug use: Not on file      Comment: Drug use: No     Sexual activity: Not on file     Other Topics Concern     Not on file     Social History Narrative    , works at [employer].  Live in [place].       I have personally reviewed and updated above noted social, family and/or past medical history.    10 point ROS of systems including Constitutional, Eyes, ENT, Respiratory, Cardiovascular, Gastrointestinal, Genitourinary, Integumentary, Muscularskeletal, Psychiatric were all negative except for pertinent positives noted in my HPI.      /80  Pulse 64  Temp 97.9  F (36.6  C)  Resp 18  Ht 6' 0.75\" (1.848 m)  Wt 259 lb (117.5 kg)  BMI 34.41 kg/m2    Exam:  Constitutional: healthy, alert and no distress  Eyes:  Fundoscopic exam normal without any vascular abnormalities  Thryoid:  Thyroid palpated without enlargement or nodularity.  Cardiovascular: Regular rate and rhythm.  No murmurs rubs or gallops heard.   Respiratory: Clear toascultation bilaterally.  No increased respiratory effort.   Gastrointestinal: Abdomen Soft, non-tender.  No masses, rebound or guarding.   Foot Exam: Monofilament sensation in tact 4/8 bilaterally.  No significant callous formation.  Nail hygiene good.  No lesions, lacerations, ulcerations or findings concerning for infection.  Peripheral pulses equal and even.    Psychiatric: mentation appears normal and affect normal/bright      ASSESSMENT/Plan :    I personally reviewed the patients' labs     Results for orders placed or performed in visit on 11/06/18   Hemoglobin A1c   Result Value Ref Range    Hemoglobin A1C 6.4 (H) 4.0 - 6.0 %   Comprehensive Metabolic Panel   Result Value Ref Range    Sodium 139 134 - 144 mmol/L    " Potassium 4.3 3.5 - 5.1 mmol/L    Chloride 107 98 - 107 mmol/L    Carbon Dioxide 24 21 - 31 mmol/L    Anion Gap 8 3 - 14 mmol/L    Glucose 116 (H) 70 - 105 mg/dL    Urea Nitrogen 17 7 - 25 mg/dL    Creatinine 1.00 0.70 - 1.30 mg/dL    GFR Estimate 79 >60 mL/min/1.7m2    GFR Estimate If Black >90 >60 mL/min/1.7m2    Calcium 9.2 8.6 - 10.3 mg/dL    Bilirubin Total 1.0 0.3 - 1.0 mg/dL    Albumin 4.4 3.5 - 5.7 g/dL    Protein Total 7.1 6.4 - 8.9 g/dL    Alkaline Phosphatase 37 34 - 104 U/L    ALT 41 7 - 52 U/L    AST 23 13 - 39 U/L   PSA Screen GH   Result Value Ref Range    PSA Screen 0.433 <3.100 ng/mL   Lipid Panel   Result Value Ref Range    Cholesterol 179 <200 mg/dL    Triglycerides 83 <150 mg/dL    HDL Cholesterol 56 23 - 92 mg/dL    LDL Cholesterol Calculated 106 (H) <100 mg/dL    Non HDL Cholesterol 123 <130 mg/dL       No images are attached to the encounter or orders placed in the encounter.      1. Hyperlipidemia LDL goal <100  - Lipid Panel; Future  - Lipid Panel    2. Screening for prostate cancer  Discussed risks of false positives and benefits of early detection with CALLI and PSA, he is currently asymptomatic.  He declines CALLI but does accept PSA.  He understands limitations of this approach.  He voiced good understanding of all considerations regarding prostate cancer screening.    - PSA Screen GH; Future  - PSA Screen GH    3. Type 2 diabetes mellitus without complication, without long-term current use of insulin (H)  It is a bit unclear the etiology of patient's diabetes.  Historically it certainly sounded like type 1 diabetes but he has substantially recovered and his current presentation is more consistent with type 2 diabetes.  Discussed benefit of weight loss to reduce insulin resistance.  His BMI is 35.  He does have peripheral neuropathy and tight glycemic control is advised.  Currently his A1c is excellent.  Continue aspirin and start Crestor.    Strongly suggest eye exam, which he has  scheduled.  I do not appreciate any abnormality on direct ophthalmoscope but discussed limited visualization.    - metFORMIN (GLUCOPHAGE) 500 MG tablet; Take 1 tablet (500 mg) by mouth 2 times daily (with meals)  Dispense: 180 tablet; Refill: 3  - Hemoglobin A1c; Future  - Comprehensive Metabolic Panel; Future  - rosuvastatin (CRESTOR) 5 MG tablet; Take 1 tablet (5 mg) by mouth daily  Dispense: 90 tablet; Refill: 3  - Hemoglobin A1c  - Comprehensive Metabolic Panel    4. Restless legs syndrome (RLS)  Discussed with patient potential etiologies of his leg pain.  Upon further discussion it sounds most consistent with restless leg syndrome and will try Mirapex.  Alternative consideration would be peripheral neuropathy as he certainly has loss of sensation but his description is atypical for this.  If pramipexole does not work we could try gabapentin.  If still not having improvement suggest further workup which may include EMG or additional lab studies  - pramipexole (MIRAPEX) 0.125 MG tablet; Take 1 tablet (0.125 mg) by mouth At Bedtime  Dispense: 90 tablet; Refill: 3    5. FAMILY HISTORY OF CARDIOVASC DIS (ISCHEMIC)  Suggest patient start Crestor but would hold off until he has trialed the pramipexole in the event he has a side effect so we can determine which medication caused it.    6. Special screening for malignant neoplasms, colon  Patient declines colonoscopy but does accept [place] guard    7. Need for prophylactic vaccination with combined diphtheria-tetanus-pertussis (DTaP) vaccine  - TDAP VACCINE (BOOSTRIX)              Patient Instructions   First try would be a medicine for restless legs - ramiprexole..  Second choice would be Gabapentin (Neurontin) which could help if it is diabetic neuropathy or RLS.  If that gives side effects we can try Lyrica (gabapentin).  Crestor at 5mg should be started once you are stable in terms of the new medications above.      Johnny Yoon    This document was created using  computer generated templates and voiceactivated software.

## 2018-11-09 DIAGNOSIS — G25.81 RESTLESS LEGS SYNDROME (RLS): ICD-10-CM

## 2018-11-09 LAB
ERYTHROCYTE [DISTWIDTH] IN BLOOD BY AUTOMATED COUNT: 12.2 % (ref 10–15)
HCT VFR BLD AUTO: 41.3 % (ref 40–53)
HGB BLD-MCNC: 14.2 G/DL (ref 13.3–17.7)
MAGNESIUM SERPL-MCNC: 2 MG/DL (ref 1.9–2.7)
MCH RBC QN AUTO: 29.6 PG (ref 26.5–33)
MCHC RBC AUTO-ENTMCNC: 34.4 G/DL (ref 31.5–36.5)
MCV RBC AUTO: 86 FL (ref 78–100)
PLATELET # BLD AUTO: 240 10E9/L (ref 150–450)
RBC # BLD AUTO: 4.79 10E12/L (ref 4.4–5.9)
VIT B12 SERPL-MCNC: 407 PG/ML (ref 180–914)
WBC # BLD AUTO: 6.6 10E9/L (ref 4–11)

## 2018-11-09 PROCEDURE — 36415 COLL VENOUS BLD VENIPUNCTURE: CPT | Performed by: FAMILY MEDICINE

## 2018-11-09 PROCEDURE — 82607 VITAMIN B-12: CPT | Performed by: FAMILY MEDICINE

## 2018-11-09 PROCEDURE — 83735 ASSAY OF MAGNESIUM: CPT | Performed by: FAMILY MEDICINE

## 2018-11-09 PROCEDURE — 85027 COMPLETE CBC AUTOMATED: CPT | Performed by: FAMILY MEDICINE

## 2018-11-09 NOTE — Clinical Note
He will be signing up for [facility], so results can be sent to him that way when they are complete.

## 2018-11-09 NOTE — PROGRESS NOTES
Patient presents to clinic today for additional labs per Dr Yoon.  Carla Reyes [facility])..................11/9/2018   10:21 AM

## 2018-11-27 ENCOUNTER — TRANSFERRED RECORDS (OUTPATIENT)
Dept: HEALTH INFORMATION MANAGEMENT | Facility: OTHER | Age: 53
End: 2018-11-27

## 2019-01-03 ENCOUNTER — TELEPHONE (OUTPATIENT)
Dept: FAMILY MEDICINE | Facility: OTHER | Age: 54
End: 2019-01-03

## 2019-01-04 NOTE — TELEPHONE ENCOUNTER
After verifying name and birth date, patient was notified of provider's information.   Carla Johnson([facility])........1/4/2019  2:32 PM

## 2019-03-30 DIAGNOSIS — Z20.828 EXPOSURE TO INFLUENZA: Primary | ICD-10-CM

## 2019-03-30 RX ORDER — OSELTAMIVIR PHOSPHATE 75 MG/1
75 CAPSULE ORAL DAILY
Qty: 10 CAPSULE | Refills: 0 | Status: SHIPPED | OUTPATIENT
Start: 2019-03-30 | End: 2019-05-07

## 2019-03-30 NOTE — PROGRESS NOTES
Family members with influenza. Patient would like to treat to prevent. Tamiflu 75 mg oral tablet take once daily for 10 days sent to [facility] pharmacy. Follow up with PCP as needed. ALEXX Dill

## 2019-04-16 ENCOUNTER — TELEPHONE (OUTPATIENT)
Dept: FAMILY MEDICINE | Facility: OTHER | Age: 54
End: 2019-04-16

## 2019-04-16 DIAGNOSIS — E78.5 HYPERLIPIDEMIA LDL GOAL <100: ICD-10-CM

## 2019-04-16 DIAGNOSIS — Z79.4 LONG TERM CURRENT USE OF INSULIN (H): ICD-10-CM

## 2019-04-16 DIAGNOSIS — E11.9 TYPE 2 DIABETES MELLITUS WITHOUT COMPLICATION, WITHOUT LONG-TERM CURRENT USE OF INSULIN (H): ICD-10-CM

## 2019-04-16 DIAGNOSIS — E11.9 TYPE 2 DIABETES MELLITUS WITHOUT COMPLICATION, WITHOUT LONG-TERM CURRENT USE OF INSULIN (H): Primary | ICD-10-CM

## 2019-04-16 LAB
ALBUMIN SERPL-MCNC: 4.4 G/DL (ref 3.5–5.7)
ALP SERPL-CCNC: 41 U/L (ref 34–104)
ALT SERPL W P-5'-P-CCNC: 66 U/L (ref 7–52)
ANION GAP SERPL CALCULATED.3IONS-SCNC: 10 MMOL/L (ref 3–14)
AST SERPL W P-5'-P-CCNC: 44 U/L (ref 13–39)
BASOPHILS # BLD AUTO: 0 10E9/L (ref 0–0.2)
BASOPHILS NFR BLD AUTO: 0.8 %
BILIRUB SERPL-MCNC: 1.4 MG/DL (ref 0.3–1)
BUN SERPL-MCNC: 15 MG/DL (ref 7–25)
CALCIUM SERPL-MCNC: 9 MG/DL (ref 8.6–10.3)
CHLORIDE SERPL-SCNC: 104 MMOL/L (ref 98–107)
CHOLEST SERPL-MCNC: 190 MG/DL
CO2 SERPL-SCNC: 23 MMOL/L (ref 21–31)
CREAT SERPL-MCNC: 1 MG/DL (ref 0.7–1.3)
DIFFERENTIAL METHOD BLD: NORMAL
EOSINOPHIL # BLD AUTO: 0.2 10E9/L (ref 0–0.7)
EOSINOPHIL NFR BLD AUTO: 2.9 %
ERYTHROCYTE [DISTWIDTH] IN BLOOD BY AUTOMATED COUNT: 11.9 % (ref 10–15)
GFR SERPL CREATININE-BSD FRML MDRD: 78 ML/MIN/{1.73_M2}
GLUCOSE SERPL-MCNC: 131 MG/DL (ref 70–105)
HBA1C MFR BLD: 7.2 % (ref 4–6)
HCT VFR BLD AUTO: 40 % (ref 40–53)
HDLC SERPL-MCNC: 46 MG/DL (ref 23–92)
HGB BLD-MCNC: 13.8 G/DL (ref 13.3–17.7)
IMM GRANULOCYTES # BLD: 0 10E9/L (ref 0–0.4)
IMM GRANULOCYTES NFR BLD: 0.2 %
LDLC SERPL CALC-MCNC: 123 MG/DL
LYMPHOCYTES # BLD AUTO: 2 10E9/L (ref 0.8–5.3)
LYMPHOCYTES NFR BLD AUTO: 39.8 %
MCH RBC QN AUTO: 29.9 PG (ref 26.5–33)
MCHC RBC AUTO-ENTMCNC: 34.5 G/DL (ref 31.5–36.5)
MCV RBC AUTO: 87 FL (ref 78–100)
MONOCYTES # BLD AUTO: 0.4 10E9/L (ref 0–1.3)
MONOCYTES NFR BLD AUTO: 8.4 %
NEUTROPHILS # BLD AUTO: 2.5 10E9/L (ref 1.6–8.3)
NEUTROPHILS NFR BLD AUTO: 47.9 %
NONHDLC SERPL-MCNC: 144 MG/DL
PLATELET # BLD AUTO: 225 10E9/L (ref 150–450)
POTASSIUM SERPL-SCNC: 4.3 MMOL/L (ref 3.5–5.1)
PROT SERPL-MCNC: 6.7 G/DL (ref 6.4–8.9)
RBC # BLD AUTO: 4.62 10E12/L (ref 4.4–5.9)
SODIUM SERPL-SCNC: 137 MMOL/L (ref 134–144)
TRIGL SERPL-MCNC: 105 MG/DL
WBC # BLD AUTO: 5.1 10E9/L (ref 4–11)

## 2019-04-16 PROCEDURE — 85025 COMPLETE CBC W/AUTO DIFF WBC: CPT | Performed by: FAMILY MEDICINE

## 2019-04-16 PROCEDURE — 82043 UR ALBUMIN QUANTITATIVE: CPT | Performed by: FAMILY MEDICINE

## 2019-04-16 PROCEDURE — 83036 HEMOGLOBIN GLYCOSYLATED A1C: CPT | Performed by: FAMILY MEDICINE

## 2019-04-16 PROCEDURE — 80053 COMPREHEN METABOLIC PANEL: CPT | Performed by: FAMILY MEDICINE

## 2019-04-16 PROCEDURE — 80061 LIPID PANEL: CPT | Performed by: FAMILY MEDICINE

## 2019-04-16 PROCEDURE — 36415 COLL VENOUS BLD VENIPUNCTURE: CPT | Performed by: FAMILY MEDICINE

## 2019-04-16 NOTE — TELEPHONE ENCOUNTER
Patient's wife called wanting to schedule labs for Fasting Diabetic Labs, Kidney function, Metabolic Panel, etc, but no such orders exist    Please submit orders as his glucose is high again and call to schedule    OK to not wait for JTC to be back in clinic unless necessary    Thank you

## 2019-04-16 NOTE — PROGRESS NOTES
Patient presents to clinic today for lab only.  Carla Reyes CMA([facility])..................4/16/2019   11:33 AM

## 2019-04-16 NOTE — TELEPHONE ENCOUNTER
Patient is wanting his routine diabetic lab work done today and he is currently fasting,   including a CBC with platelets.   COMP  A1C  LIPID  Albumin Random urine Quant

## 2019-04-16 NOTE — TELEPHONE ENCOUNTER
Patient notified that orders were placed   Nichole Freeman LPN........................4/16/2019  11:21 AM

## 2019-04-17 LAB
CREAT UR-MCNC: 169 MG/DL
MICROALBUMIN UR-MCNC: 13 MG/L
MICROALBUMIN/CREAT UR: 7.93 MG/G CR (ref 0–17)

## 2019-05-07 ENCOUNTER — OFFICE VISIT (OUTPATIENT)
Dept: FAMILY MEDICINE | Facility: OTHER | Age: 54
End: 2019-05-07
Attending: FAMILY MEDICINE
Payer: COMMERCIAL

## 2019-05-07 VITALS
DIASTOLIC BLOOD PRESSURE: 80 MMHG | RESPIRATION RATE: 16 BRPM | TEMPERATURE: 97.9 F | SYSTOLIC BLOOD PRESSURE: 118 MMHG | WEIGHT: 267 LBS | HEART RATE: 64 BPM | HEIGHT: 73 IN | BODY MASS INDEX: 35.39 KG/M2

## 2019-05-07 DIAGNOSIS — E11.9 TYPE 2 DIABETES MELLITUS WITHOUT COMPLICATION, WITHOUT LONG-TERM CURRENT USE OF INSULIN (H): Primary | ICD-10-CM

## 2019-05-07 DIAGNOSIS — G25.81 RESTLESS LEGS SYNDROME (RLS): ICD-10-CM

## 2019-05-07 LAB — TSH SERPL DL<=0.05 MIU/L-ACNC: 2.29 IU/ML (ref 0.34–5.6)

## 2019-05-07 PROCEDURE — 36415 COLL VENOUS BLD VENIPUNCTURE: CPT | Mod: ZL | Performed by: FAMILY MEDICINE

## 2019-05-07 PROCEDURE — G0463 HOSPITAL OUTPT CLINIC VISIT: HCPCS

## 2019-05-07 PROCEDURE — 84443 ASSAY THYROID STIM HORMONE: CPT | Mod: ZL | Performed by: FAMILY MEDICINE

## 2019-05-07 PROCEDURE — 99214 OFFICE O/P EST MOD 30 MIN: CPT | Performed by: FAMILY MEDICINE

## 2019-05-07 RX ORDER — GABAPENTIN 100 MG/1
100-200 CAPSULE ORAL AT BEDTIME
Qty: 60 CAPSULE | Refills: 3 | Status: SHIPPED | OUTPATIENT
Start: 2019-05-07 | End: 2019-12-16

## 2019-05-07 ASSESSMENT — MIFFLIN-ST. JEOR: SCORE: 2114.98

## 2019-05-07 NOTE — NURSING NOTE
No LMP for male patient.  Medication Reconciliation: complete    Geena Hirsch LPN  5/7/2019 8:03 AM

## 2019-05-07 NOTE — PATIENT INSTRUCTIONS
Try biotene for the dry mouth and give crestor more time.  Increase the metformin to 1000mg twice daily.  If still high after a couple of weeks call in and I will send in glipizide at low dose.

## 2019-05-07 NOTE — NURSING NOTE
Previous A1C is not at goal of <8  Lab Results   Component Value Date    A1C 7.2 04/16/2019    A1C 6.4 11/06/2018    A1C 6.2 02/27/2018    A1C 5.5 12/13/2013    A1C 5.6 03/08/2013     Urine microalbumin:creatine: 4/16/19  Foot exam Today  Eye exam March 2018 (will schedule)    Tobacco User No  Patient is on a daily aspirin  Patient is not on a Statin.(sensitivity)  Blood pressure today of:     BP Readings from Last 1 Encounters:   11/06/18 120/80      is at the goal of <139/89 for diabetics.    Geena Hirsch LPN on 5/7/2019 at 7:51 AM

## 2019-05-21 DIAGNOSIS — E11.9 TYPE 2 DIABETES MELLITUS WITHOUT COMPLICATION, WITHOUT LONG-TERM CURRENT USE OF INSULIN (H): ICD-10-CM

## 2019-05-21 NOTE — TELEPHONE ENCOUNTER
Writer received Rx request from pharmacy as follows:     metFORMIN (GLUCOPHAGE) 500 MG tablet         Sig: Take 1 tablet (500 mg) by mouth 2 times daily (with meals)    Disp:  180 tablet    Refills:  3    Start: 5/21/2019    Class: E-Prescribe    For: Type 2 diabetes mellitus without complication, without long-term current use of insulin (H)    To pharmacy: Patient states dose increased to 1000MG BID need new RX     Most recent Rx in patient's chart is as follows:    Outpatient Medication Detail      Disp Refills Start End KARY   metFORMIN (GLUCOPHAGE) 500 MG tablet 180 tablet 3 11/6/2018  No   Sig - Route: Take 1 tablet (500 mg) by mouth 2 times daily (with meals) - Oral   Sent to pharmacy as: metFORMIN (GLUCOPHAGE) 500 MG tablet   Class: E-Prescribe   Order: [id number]   E-Prescribing Status: Receipt confirmed by pharmacy (11/6/2018 10:11 AM CST)   Printout Tracking     External Result Report   Pharmacy     [facility] [address] [place] [address]     Chart review shows that patient was last seen by Dr. Yoon on 5/7/19 and office visit notes are pending. Plan with relation to Rx as requested is as follows:    Increase the metformin to 1000mg twice daily.    Writer will danisha up and route Rx request to PCP for his consideration/approval.    Unable to complete prescription refill per RN Medication Refill Policy. Casimiro Monreal 5/21/2019 4:16 PM

## 2019-05-23 ENCOUNTER — TRANSFERRED RECORDS (OUTPATIENT)
Dept: HEALTH INFORMATION MANAGEMENT | Facility: OTHER | Age: 54
End: 2019-05-23

## 2019-05-27 NOTE — PROGRESS NOTES
Nursing Notes:   Geena Hirsch LPN  5/7/2019  7:52 AM  Signed  Previous A1C is not at goal of <8  Lab Results   Component Value Date    A1C 7.2 04/16/2019    A1C 6.4 11/06/2018    A1C 6.2 02/27/2018    A1C 5.5 12/13/2013    A1C 5.6 03/08/2013     Urine microalbumin:creatine: 4/16/19  Foot exam Today  Eye exam March 2018 (will schedule)    Tobacco User No  Patient is on a daily aspirin  Patient is not on a Statin.(sensitivity)  Blood pressure today of:     BP Readings from Last 1 Encounters:   11/06/18 120/80      is at the goal of <139/89 for diabetics.    Geena Hirsch LPN on 5/7/2019 at 7:51 AM          Geena Hirsch LPN  5/7/2019  8:03 AM  Signed  No LMP for male patient.  Medication Reconciliation: complete    Geena Hirsch LPN  5/7/2019 8:03 AM          SUBJECTIVE:  Bucky Chavez is a 54 year old male who comes in today to follow-up on diabetes mellitus.  He reports historically blood sugars in the morning have been between 110 and 120.  Over the past couple months has noticed an increase to 130-150 in the morning.  In the p.m. has been anywhere from 1 60-2 13.  He has been using metformin 500 mg twice daily.  He denies any chest pain or shortness of breath.  He admits that he has not been as active this winter and has gained about 10 pounds since November.  He thinks overall he is eating quite healthy though.  Really staying away from carbohydrates.  He is tolerating aspirin 81 mg without side effect.  Also Crestor 5 mg daily but feels that it gives him dry mouth.    He has had a couple of other symptoms.  Reports occasional numbness in his left hand in the wrist and into the hand.  Seems to occur more at night.  Also has worsening neuropathy in his feet.  That seems to keep him up at night.  Describes restless legs while trying to sleep.  The Mirapex was not helpful at all.    Patient reports in a couple of occasions his wife has felt that he had some slurred speech when he became very fatigued.   Not associated with any weakness, numbness or other focal neurologic symptoms.  Resolved with sleep.  She has not noticed any facial droop or other findings of be concerning for CVA.  She is not present today.          Social History     Socioeconomic History     Marital status:      Spouse name: Not on file     Number of children: Not on file     Years of education: Not on file     Highest education level: Not on file   Occupational History     Not on file   Social Needs     Financial resource strain: Not on file     Food insecurity:     Worry: Not on file     Inability: Not on file     Transportation needs:     Medical: Not on file     Non-medical: Not on file   Tobacco Use     Smoking status: Never Smoker     Smokeless tobacco: Never Used   Substance and Sexual Activity     Alcohol use: Yes     Alcohol/week: 0.0 oz     Comment: Alcoholic Drinks/day: Occasional     Drug use: Never     Comment: Drug use: No     Sexual activity: Yes     Partners: Female   Lifestyle     Physical activity:     Days per week: Not on file     Minutes per session: Not on file     Stress: Not on file   Relationships     Social connections:     Talks on phone: Not on file     Gets together: Not on file     Attends Denominational service: Not on file     Active member of club or organization: Not on file     Attends meetings of clubs or organizations: Not on file     Relationship status: Not on file     Intimate partner violence:     Fear of current or ex partner: Not on file     Emotionally abused: Not on file     Physically abused: Not on file     Forced sexual activity: Not on file   Other Topics Concern     Not on file   Social History Narrative    , works at [employer].  Live in [place].     Current Outpatient Medications   Medication Sig Dispense Refill     aspirin EC 81 MG EC tablet Take 81 mg by mouth every morning (before breakfast)       blood glucose (POLLO CONTOUR NEXT) test strip Dispense item covered by pt ins.  "E11.9 NIDDM type II - Test 2 times/day. Reason:  strip 3     gabapentin (NEURONTIN) 100 MG capsule Take 1-2 capsules (100-200 mg) by mouth At Bedtime 60 capsule 3     metFORMIN (GLUCOPHAGE) 500 MG tablet Take 2 tablets (1,000 mg) by mouth 2 times daily (with meals) 360 tablet 1       I have personally reviewed and updated above noted social, family and/or past medical history.    10 point ROS of systems including Constitutional, Eyes, ENT, Respiratory, Cardiovascular, Gastrointestinal, Genitourinary, Integumentary, Muscularskeletal, Psychiatric were all negative except for pertinent positives noted in my HPI.      /80   Pulse 64   Temp 97.9  F (36.6  C)   Resp 16   Ht 1.854 m (6' 1\")   Wt 121.1 kg (267 lb)   BMI 35.23 kg/m      Exam:  Constitutional: healthy, alert and no distress  Eyes:  Fundoscopic exam normal without any vascular abnormalities  Thryoid:  Thyroid palpated without enlargement or nodularity.  Cardiovascular: Regular rate and rhythm.  No murmurs rubs or gallops heard.   Respiratory: Clear toascultation bilaterally.  No increased respiratory effort.   Gastrointestinal: Abdomen Soft, non-tender.  No masses, rebound or guarding.   Foot Exam: Monofilament sensation in tact 4/8 on right and 5/8 on left. No significant callous formation.  Nail hygiene good.  No lesions, lacerations, ulcerations or findings concerning for infection.  Peripheral pulses equal and even.    Neurologic: Tinel's at median nerve negative.  Phalen's weakly positive but atypical for carpal tunnel.  Psychiatric: mentation appears normal and affect normal/bright      ASSESSMENT/Plan :    I personally reviewed the patients' labs    Results for orders placed or performed in visit on 05/07/19   TSH Reflex GH   Result Value Ref Range    TSH Reflex 2.29 0.34 - 5.60 IU/mL       No images are attached to the encounter or orders placed in the encounter.      1. Type 2 diabetes mellitus without complication, without long-term " current use of insulin (H)  A1c has increased significantly.  TSH is normal.  Patient is on aspirin 81 mg.  He is on a statin.  Will increase metformin to 1000 mg twice daily.  If persistent elevated blood sugars would suggest addition of glipizide.  At that point if continues to have elevated blood sugars would suggest GLP-1 agonist or DPP inhibitor.  Stressed importance of gradual weight loss.  Goal weight loss of 20 pounds over the next 6 months.    - blood glucose (POLLO CONTOUR NEXT) test strip; Dispense item covered by pt ins. E11.9 NIDDM type II - Test 2 times/day. Reason: DM  Dispense: 180 strip; Refill: 3  - TSH Reflex GH; Future  - TSH Reflex GH    2. Restless legs syndrome (RLS)  We added on a TSH which came back normal.  I think Neurontin at low dose may be helpful for his restless legs and also for his neuropathy.  We will gradually titrate up.  Could consider taking during the day as well at low-dose if does not cause significant sedation.  I discussed with patient setting him up for an EMG to see if he has carpal tunnel and consideration of either injection or release.  He is not interested in this at this time.  I explained that if he does have significant nerve compression has not treated it can cause permanent nerve damage.  Currently symptoms are intermittent.    - TSH Reflex GH; Future  - gabapentin (NEURONTIN) 100 MG capsule; Take 1-2 capsules (100-200 mg) by mouth At Bedtime  Dispense: 60 capsule; Refill: 3  - TSH Reflex GH      He will continue on the Crestor and see if switching to Biotene toothpaste helps with dry mouth.    They will pay particular attention for any recurrent episodes of slurred speech or any additional symptoms or be more concerning for underlying cerebrovascular disease.        Patient Instructions   Try biotene for the dry mouth and give crestor more time.  Increase the metformin to 1000mg twice daily.  If still high after a couple of weeks call in and I will send in  glipizide at low dose.        Johnny Yoon    This document was created using computer generated templates and voiceactivated software.

## 2019-06-26 ENCOUNTER — TELEPHONE (OUTPATIENT)
Dept: FAMILY MEDICINE | Facility: OTHER | Age: 54
End: 2019-06-26

## 2019-06-26 NOTE — TELEPHONE ENCOUNTER
Patient has been working on the road and has been doing a lot more physical labor in the heat. He has only been checking his blood sugars in the evening and they have been running around 79. He's also been having diarrhea. Is wondering if he can cut back on his Metformin.

## 2019-06-26 NOTE — TELEPHONE ENCOUNTER
States that they would like to know about the metformin dosage and wondering if they can cut it back at all.

## 2019-06-26 NOTE — TELEPHONE ENCOUNTER
Patient notified of Dr Yoon's response. He will schedule a follow up with labs next month with Dr Benjamin.

## 2019-11-03 ENCOUNTER — HEALTH MAINTENANCE LETTER (OUTPATIENT)
Age: 54
End: 2019-11-03

## 2019-11-13 DIAGNOSIS — E11.9 TYPE 2 DIABETES MELLITUS WITHOUT COMPLICATION, WITHOUT LONG-TERM CURRENT USE OF INSULIN (H): Primary | ICD-10-CM

## 2019-11-18 RX ORDER — ROSUVASTATIN CALCIUM 5 MG/1
TABLET, COATED ORAL
Qty: 90 TABLET | Refills: 3 | Status: SHIPPED | OUTPATIENT
Start: 2019-11-18 | End: 2020-12-04

## 2019-11-18 NOTE — TELEPHONE ENCOUNTER
[facility]  sent Rx request for the following:      METFORMIN 500MG TAB  Sig: TAKE 2 TABLETS BY MOUTH TWICE DAILY WITH MEALS  Last Prescription Date:   5/22/19  Last Fill Qty/Refills:         360, R-1    Last Office Visit:              5/7/19 (Former Dr. Yoon Pt)   Future Office visit:             Next 5 appointments (look out 90 days)    Nov 25, 2019 10:00 AM CST  [facility] [address] [facility] [phone]      Routing refill request to provider for review/approval because:  Biguanide Agents [id number] 9:29 AM   Blood pressure less than 140/90 in past 6 months    Patient has documented A1c within the specified period of time.     Noted upcoming appointment. Will route to Dr. Benjamin, for consideration of 90-day refill.     Unable to complete prescription refill per RN Medication Refill Policy. Ericka Burnett RN .............. 11/18/2019  10:45 AM

## 2019-11-18 NOTE — TELEPHONE ENCOUNTER
[facility] sent Rx request for the following:      ROSUVASTATIN KESHA 5MG TAB  Sig: TAKE 1 TABLET BY MOUTH ONCE DAILY  Last Prescription Date:   11/6/18  Last Fill Qty/Refills:         90, R-3 (End: 5/7/19)    Last Office Visit:              5/7/19 (Former Dr. Yoon Pt)   Future Office visit:                 Next 5 appointments (look out 90 days)    Nov 25, 2019 10:00 AM CST  Office Visit with Galdino Benjamin MD  [facility] ([facility])   Routing refill request to provider for review/approval because:    Discontinued 5/7/19; no reason noted.     Statins Protocol [id number] 10:41 AM   Medication is active on med list     Per LOV Note:  Patient is not on a Statin.(sensitivity). He will continue on the Crestor and see if switching to Biotene toothpaste helps with dry mouth.    Noted upcoming appointment. Will route to Dr. Benjamin, for consideration of 90-day refill.     Unable to complete prescription refill per RN Medication Refill Policy. Ericka Burnett RN .............. 11/18/2019  10:49 AM

## 2019-11-19 ENCOUNTER — TELEPHONE (OUTPATIENT)
Dept: FAMILY MEDICINE | Facility: OTHER | Age: 54
End: 2019-11-19

## 2019-11-19 DIAGNOSIS — E11.9 TYPE 2 DIABETES MELLITUS WITHOUT COMPLICATION, WITHOUT LONG-TERM CURRENT USE OF INSULIN (H): Primary | ICD-10-CM

## 2019-11-19 NOTE — TELEPHONE ENCOUNTER
After verifying pts name and date of birth with spouse, Barb notified of message below and pt was transferred to appointment line.  Aniyah Evangelista LPN

## 2019-11-19 NOTE — TELEPHONE ENCOUNTER
After verifying pts name and date of birth with pt, pt gave verbal permission to speak with spouse. Pt does not want to wait until December for labs.  Aniyah Evangelista LPN

## 2019-11-19 NOTE — TELEPHONE ENCOUNTER
Patient has appointment scheduled on 12/16 but would like to have his A1C checked before he leaves on a trip out of the country. He is leaving 11/30/19. Can orders be placed?    Please call Bucky at [phone]    Ange Biswas on 11/19/2019 at 8:48 AM

## 2019-11-19 NOTE — TELEPHONE ENCOUNTER
Ordered A1c. He had remainder of labs in April, so other things like electrotypes and cholesterol check in spring. He should establish care for diabetes with a new provider since Dr Yoon is not in clinic.

## 2019-11-21 DIAGNOSIS — E11.9 TYPE 2 DIABETES MELLITUS WITHOUT COMPLICATION, WITHOUT LONG-TERM CURRENT USE OF INSULIN (H): ICD-10-CM

## 2019-11-21 LAB — HBA1C MFR BLD: 8 % (ref 4–6)

## 2019-11-21 PROCEDURE — 83036 HEMOGLOBIN GLYCOSYLATED A1C: CPT | Mod: ZL | Performed by: FAMILY MEDICINE

## 2019-11-21 PROCEDURE — 36415 COLL VENOUS BLD VENIPUNCTURE: CPT | Mod: ZL | Performed by: FAMILY MEDICINE

## 2019-12-16 ENCOUNTER — OFFICE VISIT (OUTPATIENT)
Dept: FAMILY MEDICINE | Facility: OTHER | Age: 54
End: 2019-12-16
Attending: FAMILY MEDICINE
Payer: COMMERCIAL

## 2019-12-16 VITALS
DIASTOLIC BLOOD PRESSURE: 77 MMHG | RESPIRATION RATE: 16 BRPM | BODY MASS INDEX: 34.59 KG/M2 | TEMPERATURE: 97.2 F | SYSTOLIC BLOOD PRESSURE: 111 MMHG | WEIGHT: 262.2 LBS | OXYGEN SATURATION: 98 % | HEART RATE: 60 BPM

## 2019-12-16 DIAGNOSIS — E11.42 TYPE 2 DIABETES MELLITUS WITH DIABETIC POLYNEUROPATHY, WITHOUT LONG-TERM CURRENT USE OF INSULIN (H): ICD-10-CM

## 2019-12-16 PROCEDURE — 99214 OFFICE O/P EST MOD 30 MIN: CPT | Performed by: FAMILY MEDICINE

## 2019-12-16 PROCEDURE — G0463 HOSPITAL OUTPT CLINIC VISIT: HCPCS

## 2019-12-16 RX ORDER — PREGABALIN 50 MG/1
50-100 CAPSULE ORAL AT BEDTIME
Qty: 60 CAPSULE | Refills: 3 | Status: SHIPPED | OUTPATIENT
Start: 2019-12-16 | End: 2020-12-04

## 2019-12-16 ASSESSMENT — PAIN SCALES - GENERAL: PAINLEVEL: MILD PAIN (3)

## 2019-12-16 NOTE — NURSING NOTE
Pt presents to clinic today for diabetic check and medication management.    Previous A1C is at goal of <8  Lab Results   Component Value Date    A1C 8.0 11/21/2019    A1C 7.2 04/16/2019    A1C 6.4 11/06/2018    A1C 6.2 02/27/2018    A1C 5.5 12/13/2013     Urine microalbumin:creatine: 4/16/19  Foot exam 5/17/19  Eye exam 5/23/19    Tobacco User no  Patient is on a daily aspirin  Patient is on a Statin.  Blood pressure today of:     BP Readings from Last 1 Encounters:   12/16/19 111/77      is at the goal of <139/89 for diabetics.      Medication Reconciliation: complete  Aniyah Evangelista LPN

## 2019-12-16 NOTE — PATIENT INSTRUCTIONS
Continue metformin  Start pregabalin 50 mg at night for a week. If tolerating, increase to 2 pills every night  Call if side effects. If no benefit after a few weeks of 100 mg, then we can try a higher dose.  Follow-up in 3 months for an A1c

## 2019-12-16 NOTE — PROGRESS NOTES
Nursing Notes:   Aniyah Evangelista LPN  12/16/2019  9:17 AM  Sign at exiting of workspace  Pt presents to clinic today for diabetic check and medication management.    Previous A1C is at goal of <8  Lab Results   Component Value Date    A1C 8.0 11/21/2019    A1C 7.2 04/16/2019    A1C 6.4 11/06/2018    A1C 6.2 02/27/2018    A1C 5.5 12/13/2013     Urine microalbumin:creatine: 4/16/19  Foot exam 5/17/19  Eye exam 5/23/19    Tobacco User no  Patient is on a daily aspirin  Patient is on a Statin.  Blood pressure today of:     BP Readings from Last 1 Encounters:   12/16/19 111/77      is at the goal of <139/89 for diabetics.      Medication Reconciliation: complete  Aniyah Evangelista LPN      SUBJECTIVE:  54 year old male presents to follow up on diabetes.  His A1c has been increasing over time.  Most recently metformin was increased from 500 mg twice daily up to thousand milligrams twice daily.  Despite this blood sugars are higher.  He reports regular exercise and watching diet.  Admits he could do a little bit better, but feels overall he is doing well.  Was recently in [place] for a couple weeks.  Watched diet then, but believes that could be part of the higher A1c.    Started gabapentin to help with peripheral neuropathy. Gave him a dry mouth, so he stopped.  Has most of his discomfort when trying to go to bed.    REVIEW OF SYSTEMS:    Constitutional: negative  Respiratory: negative  Cardiovascular: negative  Gastrointestinal: negative    Current Outpatient Medications   Medication Sig Dispense Refill     aspirin EC 81 MG EC tablet Take 81 mg by mouth every morning (before breakfast)       blood glucose (POLLO CONTOUR NEXT) test strip Dispense item covered by pt ins. E11.9 NIDDM type II - Test 2 times/day. Reason:  strip 3     metFORMIN (GLUCOPHAGE) 500 MG tablet TAKE 2 TABLETS BY MOUTH TWICE DAILY WITH MEALS 360 tablet 0     rosuvastatin (CRESTOR) 5 MG tablet TAKE 1 TABLET BY MOUTH ONCE DAILY 90 tablet 3      Allergies   Allergen Reactions     Penicillins Rash and Hives     As a child     Seasonal Allergies        OBJECTIVE:  /77   Pulse 60   Temp 97.2  F (36.2  C) (Tympanic)   Resp 16   Wt 118.9 kg (262 lb 3.2 oz)   SpO2 98%   BMI 34.59 kg/m      EXAM:  General Appearance: Alert. No acute distress  Chest/Respiratory Exam: Clear to auscultation bilaterally  Cardiovascular Exam: Regular rate and rhythm. S1, S2, no murmur, gallop, or rubs.  Extremities: No lower extremity edema.  Foot Exam: Left and right foot: monofilament sensation decreased distal feet.  Intact midfoot and more proximally. Good pedal pulses, no lesions, nail hygiene good.  Psychiatric: Normal affect and mentation      ASSESSMENT/PLAN:    ICD-10-CM    1. Type 2 diabetes mellitus with diabetic polyneuropathy, without long-term current use of insulin (H) E11.42 metFORMIN (GLUCOPHAGE) 500 MG tablet     pregabalin (LYRICA) 50 MG capsule       We discussed options for diabetes control.  A1c is showing a general increase despite no changes to his diet and exercise.  He may have type I and a half diabetes.  He may just be losing ability to produce insulin at a rate to keep up with insulin resistance.  Recommend GLP-1 to help bring down A1c.  Other options including sulfonylurea, DPP 4, and insulin were all discussed.  He believes that stress of poor sleep is contributing to elevated numbers.  He would like to make no changes at this time and diabetes treatments.  Continue same metformin dosing.    Options for neuropathy are discussed.  Duloxetine or pregabalin could be considered.  He would like to try the pregabalin.  Ideally this would be better tolerated than the gabapentin.  Can take 50 mg at night for a week and then increase to 100 mg every night.  If necessary he can call for higher dose.    Continue working on diet and exercise.  Recheck A1c in 3 months.  If continues to increase then he sounds prepared to take additional  medication.    Greater than 50% of this 32 minute appointment spent on counseling   Follow-up 3 months    Galdino Benjamin MD    This document was prepared using a combination of typing and voice generated software.  While every attempt was made for accuracy, spelling and grammatical errors may exist.

## 2020-03-27 ENCOUNTER — TELEPHONE (OUTPATIENT)
Dept: FAMILY MEDICINE | Facility: OTHER | Age: 55
End: 2020-03-27

## 2020-03-27 NOTE — TELEPHONE ENCOUNTER
Patients wife states two of there foster kids have symptoms of influenza and in the past [facility] had prescribed tamaflu for patient ahead of time so when/if he gets it he can start on it right away since he is a diabetic and it is worse for him they were hoping [facility] would be willing to do this as well.    Patient was informed [facility] is not in clinic today but would like another nurse to call them today.    Carla Soto on 3/27/2020 at 9:36 AM

## 2020-03-30 NOTE — TELEPHONE ENCOUNTER
Pt currently has no symptoms and will call if he does start getting them.  Aniyah Evangelista LPN

## 2020-03-30 NOTE — TELEPHONE ENCOUNTER
Patients wife called again because they still have not received a phone call in regards to the medication request. Please call them today. Malissa Howard on 3/30/2020 at 12:41 PM

## 2020-06-11 DIAGNOSIS — E11.9 TYPE 2 DIABETES MELLITUS WITHOUT COMPLICATION, WITHOUT LONG-TERM CURRENT USE OF INSULIN (H): ICD-10-CM

## 2020-06-11 NOTE — TELEPHONE ENCOUNTER
[id number] of [facility] sent Rx request for the following:      Requested Prescriptions   Pending Prescriptions Disp Refills     CONTOUR NEXT TEST test strip [Pharmacy Med Name: Contour Next Test In Vitro Strip]  0     Sig: USE 1 STRIP TO CHECK GLUCOSE TWICE DAILY   Last Prescription Date:   5/7/19  Last Fill Qty/Refills:         180, R-3    Last Office Visit:              12/16/19  Future Office visit:           None    Per LOV Note, Pt was instructed to follow up 3 months later; around 3/16/20. Called and spoke to Patient after verifying last name and date of birth. Pt states he has established care at [facility] in [place]. Refill request refused, with note to pharmacy, to re-route as appropriate.     Unable to complete prescription refill per RN Medication Refill Policy. Ericka Burnett RN .............. 6/11/2020  10:48 AM

## 2020-11-16 ENCOUNTER — HEALTH MAINTENANCE LETTER (OUTPATIENT)
Age: 55
End: 2020-11-16

## 2020-11-29 DIAGNOSIS — E11.9 TYPE 2 DIABETES MELLITUS WITHOUT COMPLICATION, WITHOUT LONG-TERM CURRENT USE OF INSULIN (H): ICD-10-CM

## 2020-12-01 ENCOUNTER — TRANSFERRED RECORDS (OUTPATIENT)
Dept: HEALTH INFORMATION MANAGEMENT | Facility: OTHER | Age: 55
End: 2020-12-01

## 2020-12-01 DIAGNOSIS — E11.9 TYPE 2 DIABETES MELLITUS WITHOUT COMPLICATION, WITHOUT LONG-TERM CURRENT USE OF INSULIN (H): ICD-10-CM

## 2020-12-01 LAB — RETINOPATHY: NEGATIVE

## 2020-12-01 RX ORDER — ROSUVASTATIN CALCIUM 5 MG/1
TABLET, COATED ORAL
Qty: 30 TABLET | Refills: 0 | OUTPATIENT
Start: 2020-12-01

## 2020-12-01 NOTE — TELEPHONE ENCOUNTER
[id number] of [place] sent Rx request for the following:      Requested Prescriptions   Pending Prescriptions Disp Refills   rosuvastatin (CRESTOR) 5 MG tablet [Pharmacy Med Name: Rosuvastatin Calcium 5 MG Oral Tablet] 30 tablet 0    Sig: Take 1 tablet by mouth once daily   Last Prescription Date:   11/18/19  Last Fill Qty/Refills:         90, R-3    Last Office Visit:              12/16/19  Future Office visit:           None     Statins Protocol Failed - 12/1/2020  8:30 AM       Failed - LDL on file in past 12 months     Per refill encounter, dated 6/11/20:  Per LOV Note, Pt was instructed to follow up 3 months later; around 3/16/20. Called and spoke to Patient after verifying last name and date of birth. Pt states he has established care at [facility]. Refill request refused, with note to pharmacy, to re-route as appropriate.     Unable to complete prescription refill per RN Medication Refill Policy. Ericka Burnett RN .............. 12/1/2020  8:36 AM

## 2020-12-02 RX ORDER — ROSUVASTATIN CALCIUM 5 MG/1
TABLET, COATED ORAL
Qty: 30 TABLET | Refills: 0 | OUTPATIENT
Start: 2020-12-02

## 2020-12-02 NOTE — TELEPHONE ENCOUNTER
[id number] of [place] sent Rx request for the following:      Requested Prescriptions   Pending Prescriptions Disp Refills   rosuvastatin (CRESTOR) 5 MG tablet [Pharmacy Med Name: Rosuvastatin Calcium 5 MG Oral Tablet] 30 tablet 0    Sig: Take 1 tablet by mouth once daily     Per LOV Note, Pt was instructed to follow up 3 months later; around 3/16/20. Called and spoke to Patient after verifying last name and date of birth. Pt states he has established care at [facility] in [place]. Refill request refused, with note to pharmacy, to re-route as appropriate.     Unable to complete prescription refill per RN Medication Refill Policy. Ericka Burnett RN .............. 12/2/2020  9:24 AM

## 2020-12-04 ENCOUNTER — OFFICE VISIT (OUTPATIENT)
Dept: PEDIATRICS | Facility: OTHER | Age: 55
End: 2020-12-04
Attending: INTERNAL MEDICINE
Payer: COMMERCIAL

## 2020-12-04 VITALS
DIASTOLIC BLOOD PRESSURE: 60 MMHG | OXYGEN SATURATION: 96 % | BODY MASS INDEX: 34.11 KG/M2 | SYSTOLIC BLOOD PRESSURE: 102 MMHG | RESPIRATION RATE: 16 BRPM | HEART RATE: 51 BPM | WEIGHT: 257.4 LBS | HEIGHT: 73 IN | TEMPERATURE: 97.3 F

## 2020-12-04 DIAGNOSIS — Z11.59 NEED FOR HEPATITIS C SCREENING TEST: ICD-10-CM

## 2020-12-04 DIAGNOSIS — Z11.4 ENCOUNTER FOR SCREENING FOR HIV: ICD-10-CM

## 2020-12-04 DIAGNOSIS — Z12.11 COLON CANCER SCREENING: ICD-10-CM

## 2020-12-04 DIAGNOSIS — I10 HYPERTENSION GOAL BP (BLOOD PRESSURE) < 140/90: ICD-10-CM

## 2020-12-04 DIAGNOSIS — Z76.89 ENCOUNTER TO ESTABLISH CARE: Primary | ICD-10-CM

## 2020-12-04 DIAGNOSIS — D49.2 ATYPICAL SQUAMOPROLIFERATIVE SKIN LESION: ICD-10-CM

## 2020-12-04 DIAGNOSIS — E66.09 NON MORBID OBESITY DUE TO EXCESS CALORIES: ICD-10-CM

## 2020-12-04 DIAGNOSIS — E78.5 HYPERLIPIDEMIA LDL GOAL <100: ICD-10-CM

## 2020-12-04 DIAGNOSIS — E11.65 UNCONTROLLED TYPE 2 DIABETES MELLITUS WITH HYPERGLYCEMIA (H): ICD-10-CM

## 2020-12-04 LAB
ANION GAP SERPL CALCULATED.3IONS-SCNC: 7 MMOL/L (ref 3–14)
BUN SERPL-MCNC: 18 MG/DL (ref 7–25)
CALCIUM SERPL-MCNC: 9.7 MG/DL (ref 8.6–10.3)
CHLORIDE SERPL-SCNC: 104 MMOL/L (ref 98–107)
CHOLEST SERPL-MCNC: 130 MG/DL
CO2 SERPL-SCNC: 25 MMOL/L (ref 21–31)
CREAT SERPL-MCNC: 1.11 MG/DL (ref 0.7–1.3)
GFR SERPL CREATININE-BSD FRML MDRD: 69 ML/MIN/{1.73_M2}
GLUCOSE SERPL-MCNC: 148 MG/DL (ref 70–105)
HBA1C MFR BLD: 8.2 % (ref 4–6)
HDLC SERPL-MCNC: 51 MG/DL (ref 23–92)
LDLC SERPL CALC-MCNC: 58 MG/DL
NONHDLC SERPL-MCNC: 79 MG/DL
POTASSIUM SERPL-SCNC: 4.7 MMOL/L (ref 3.5–5.1)
SODIUM SERPL-SCNC: 136 MMOL/L (ref 134–144)
TRIGL SERPL-MCNC: 105 MG/DL
VIT B12 SERPL-MCNC: 288 PG/ML (ref 180–914)

## 2020-12-04 PROCEDURE — 86803 HEPATITIS C AB TEST: CPT | Mod: ZL | Performed by: INTERNAL MEDICINE

## 2020-12-04 PROCEDURE — 80061 LIPID PANEL: CPT | Mod: ZL | Performed by: INTERNAL MEDICINE

## 2020-12-04 PROCEDURE — G0463 HOSPITAL OUTPT CLINIC VISIT: HCPCS | Mod: 25

## 2020-12-04 PROCEDURE — 99214 OFFICE O/P EST MOD 30 MIN: CPT | Performed by: INTERNAL MEDICINE

## 2020-12-04 PROCEDURE — 82607 VITAMIN B-12: CPT | Mod: ZL | Performed by: INTERNAL MEDICINE

## 2020-12-04 PROCEDURE — 80048 BASIC METABOLIC PNL TOTAL CA: CPT | Mod: ZL | Performed by: INTERNAL MEDICINE

## 2020-12-04 PROCEDURE — 36415 COLL VENOUS BLD VENIPUNCTURE: CPT | Mod: ZL | Performed by: INTERNAL MEDICINE

## 2020-12-04 PROCEDURE — 83036 HEMOGLOBIN GLYCOSYLATED A1C: CPT | Mod: ZL | Performed by: INTERNAL MEDICINE

## 2020-12-04 PROCEDURE — 87389 HIV-1 AG W/HIV-1&-2 AB AG IA: CPT | Mod: ZL | Performed by: INTERNAL MEDICINE

## 2020-12-04 PROCEDURE — 90686 IIV4 VACC NO PRSV 0.5 ML IM: CPT

## 2020-12-04 PROCEDURE — G0463 HOSPITAL OUTPT CLINIC VISIT: HCPCS

## 2020-12-04 PROCEDURE — 90732 PPSV23 VACC 2 YRS+ SUBQ/IM: CPT

## 2020-12-04 RX ORDER — METFORMIN HCL 500 MG
2000 TABLET, EXTENDED RELEASE 24 HR ORAL AT BEDTIME
Qty: 360 TABLET | Refills: 3 | Status: SHIPPED | OUTPATIENT
Start: 2020-12-04 | End: 2021-04-22

## 2020-12-04 RX ORDER — METFORMIN HCL 500 MG
2000 TABLET, EXTENDED RELEASE 24 HR ORAL AT BEDTIME
COMMUNITY
End: 2020-12-04

## 2020-12-04 RX ORDER — LISINOPRIL 2.5 MG/1
2.5 TABLET ORAL DAILY
Qty: 90 TABLET | Refills: 3 | Status: SHIPPED | OUTPATIENT
Start: 2020-12-04 | End: 2021-04-22

## 2020-12-04 RX ORDER — ROSUVASTATIN CALCIUM 5 MG/1
5 TABLET, COATED ORAL DAILY
Qty: 90 TABLET | Refills: 3 | Status: SHIPPED | OUTPATIENT
Start: 2020-12-04 | End: 2021-04-22

## 2020-12-04 RX ORDER — GLIPIZIDE 5 MG/1
5 TABLET, FILM COATED, EXTENDED RELEASE ORAL DAILY
Qty: 90 TABLET | Refills: 3 | Status: SHIPPED | OUTPATIENT
Start: 2020-12-04 | End: 2021-04-22

## 2020-12-04 ASSESSMENT — MIFFLIN-ST. JEOR: SCORE: 2058.5

## 2020-12-04 ASSESSMENT — PAIN SCALES - GENERAL: PAINLEVEL: NO PAIN (0)

## 2020-12-04 NOTE — PATIENT INSTRUCTIONS
Aspects of Diabetes we can improve:  Hemoglobin A1c Lab Results   Component Value Date    A1C 8.0 11/21/2019    A1C 7.2 04/16/2019    A1C 6.4 11/06/2018    A1C 6.2 02/27/2018    A1C 5.5 12/13/2013    Goal range is under 8. Best is 6.5 to 7   Blood Pressure 102/60 Goal to keep less than 140/90   Tobacco  reports that he has never smoked. He has never used smokeless tobacco. Goal to abstain from tobacco   Aspirin yes Aspirin reduces risk of heart disease and stroke   ACE/ARB Start lisinopril These medications reduce risk of kidney disease   Cholesterol crestor Statins reduce risk of heart disease and stroke   Eye Exam annual Annual diabetic eye exam   Healthy weight Body mass index is 34.43 kg/m . Goal BMI under 30, best is under 25.      -- I'm trying to exercise daily (goal at least 20 min/day) with moderate aerobic activity   -- Eat healthy (resources from ADA at http://www.diabetes.org/)   -- I'm taking good care of my feet. Consider seeing the Podiatrist   -- Check blood sugars as directed, record in log book and bring to every appointment   -- Goal sugar before breakfast: under 140   -- Goal sugar 2 hours after supper: under 170   -- Next diabetes lab draw: 3-6 months   -- Next diabetes office visit: 3-6 months       -- Flu and pneumonia   -- Get the new shingles vaccine series from a nurse-only visit, pharmacy or [facility] ([facility] RN).        Your BMI is Body mass index is 34.43 kg/m .  (BMI ranges: Normal 18.5 - 25, Overweight 25 - 30, Obesity greater than 30,     Morbid Obesity greater than 40 or greater than 35 with associated conditions.)    Facts about losing weight:   -- Overweight and Obesity increase your risk for developing diabetes, high blood pressure and stroke, and shorten your life.   -- 90% of weight loss comes from dietary changes, only 10% from exercise    What should I do?   -- Work on 5-10% weight loss   -- Focus on a few healthy dietary changes   -- Eat more fresh fruits and  vegetables, and fewer carbohydrates   -- Cut out all calorie-containing beverages (milk, juice, alcohol, etc)   -- Exercise every day   -- Weigh yourself once a week   -- Consider the DASH Diet (http://http://bit.ly/DASHDiet - redirects to the NIH)   -- Consider Weight Watchers (http://www.weightArsenal Vascular.Street[employer] Data)   -- Consider My Fitness Pal (iOS, Android, http://www.PlayMobs.com)

## 2020-12-04 NOTE — PROGRESS NOTES
"Subjective  Bucky Chavez is a 55 year old male who presents for [facility] care, diabetes check.  Previous physician was Dr. Yoon, then a physician in [place] who retired.  He has a history of diabetes.  Previously was on insulin.  Has weaned himself down to Metformin after losing some weight.  Blood sugars do remain elevated in the 200-2 30 range.  He continues on rosuvastatin for treatment of hyperlipidemia.  He does have some muscle aches.  He is on aspirin 81 for prophylaxis.  Has not been on an ACE inhibitor.  Due for eye exam.  He has a bump on his face next to his left eye which will not go away.  Its been there for 6 months.  He has had chest pains with exertion his whole life which feels like a pulling sensation.  He has no change in the symptoms with exertion.    Problem List/PMH: reviewed in EMR, and made relevant updates today.  Medications: reviewed in EMR, and made relevant updates today.  Allergies: reviewed in EMR, and made relevant updates today.    Social Hx:  Social History     Tobacco Use     Smoking status: Never Smoker     Smokeless tobacco: Never Used   Substance Use Topics     Alcohol use: Yes     Alcohol/week: 0.0 standard drinks     Comment: Alcoholic Drinks/day: Occasional     Drug use: Never     Social History     Social History Narrative    , works at [employer].  Live in [place].     I reviewed social history and made relevant updates today.    Family Hx:   Family History   Problem Relation Age of Onset     Myocardial Infarction Mother      Myocardial Infarction Brother      Cancer Maternal Grandmother         Cancer,lung smoker     Diabetes No family hx of        Objective  Vitals: reviewed in EMR.  /60 (BP Location: Right arm, Patient Position: Sitting, Cuff Size: Adult Large)   Pulse 51   Temp 97.3  F (36.3  C) (Tympanic)   Resp 16   Ht 1.842 m (6' 0.5\")   Wt 116.8 kg (257 lb 6.4 oz)   SpO2 96%   BMI 34.43 kg/m      Gen: Pleasant male, NAD.  HEENT: MMM, no " OP erythema.   Neck: Supple, no JVD, no bruits.  CV: RRR no m/r/g.   Pulm: CTAB no w/r/r  Neuro: Grossly intact  Msk: No lower extremity edema.  Skin: No concerning lesions.  Psychiatric: Normal affect and insight. Does not appear anxious or depressed.    Foot Exam:  12/4/2020  Intact to monofilament bilaterally.  Skin intact without erythema.    Labs:  Lab Results   Component Value Date    WBC 5.1 04/16/2019    HGB 13.8 04/16/2019    HCT 40.0 04/16/2019     04/16/2019    CHOL 190 04/16/2019    TRIG 105 04/16/2019    HDL 46 04/16/2019    ALT 66 (H) 04/16/2019    AST 44 (H) 04/16/2019     04/16/2019    BUN 15 04/16/2019    CO2 23 04/16/2019    TSH 2.05 12/13/2012       Glucose   Date Value Ref Range Status   04/16/2019 131 (H) 70 - 105 mg/dL Final   11/06/2018 116 (H) 70 - 105 mg/dL Final     Hemoglobin A1C   Date Value Ref Range Status   11/21/2019 8.0 (H) 4.0 - 6.0 % Final   04/16/2019 7.2 (H) 4.0 - 6.0 % Final     Creatinine   Date Value Ref Range Status   04/16/2019 1.00 0.70 - 1.30 mg/dL Final   11/06/2018 1.00 0.70 - 1.30 mg/dL Final     LDL Cholesterol Calculated   Date Value Ref Range Status   04/16/2019 123 (H) <100 mg/dL Final     Comment:     Above desirable:  100-129 mg/dl  Borderline High:  130-159 mg/dL  High:             160-189 mg/dL  Very high:       >189 mg/dl       No results found for: TS            Assessment    ICD-10-CM    1. Encounter to establish care  Z76.89    2. Hyperlipidemia LDL goal <100  E78.5 Basic Metabolic Panel     Lipid Profile     rosuvastatin (CRESTOR) 5 MG tablet   3. Hypertension goal BP (blood pressure) < 140/90  I10 Basic Metabolic Panel   4. Need for hepatitis C screening test  Z11.59 Hepatitis C Screen Reflex to HCV RNA Quant and Genotype   5. Encounter for screening for HIV  Z11.4 HIV Antigen Antibody Combo   6. Uncontrolled type 2 diabetes mellitus with hyperglycemia (H)  E11.65 Hemoglobin A1c     metFORMIN (GLUCOPHAGE-XR) 500 MG 24 hr tablet     blood  glucose (POLLO CONTOUR NEXT) test strip     lisinopril (ZESTRIL) 2.5 MG tablet     Vitamin B12     AMBULATORY ADULT DIABETES EDUCATOR REFERRAL   7. Non morbid obesity due to excess calories  E66.09    8. Colon cancer screening  Z12.11 ABSTRACT COLOGUARD-NO CHARGE   9. Atypical squamoproliferative skin lesion  D49.2 DERMATOLOGY ADULT REFERRAL     Orders Placed This Encounter   Procedures     Pneumococcal vaccine 23 valent PPSV23  (Pneumovax) [[id number]]     GH-IMM- FLU VAC PRESRV FREE QUAD SPLIT VIR > 6 MONTHS IM     Basic Metabolic Panel     Lipid Profile     Hemoglobin A1c     Hepatitis C Screen Reflex to HCV RNA Quant and Genotype     HIV Antigen Antibody Combo     Vitamin B12     ABSTRACT COLOGUARD-NO CHARGE     AMBULATORY ADULT DIABETES EDUCATOR REFERRAL     DERMATOLOGY ADULT REFERRAL       Previous documentation indicates diabetes mellitus type 1 which is incorrect.  He is maintaining his glucoses with metformin and no other agents which would be implausible with diabetes mellitus type 1.  We could consider obtaining additional testing to confirm this if desired in the future.  We had a lengthy discussion today with regards to diabetes mellitus type 2 including pathophysiology, expected clinical course, potential treatments and outcomes.  Lifestyle modification is recommended including weight loss.    Plan   -- Expected clinical course discussed   -- Medications and their side effects discussed  Patient Instructions     Aspects of Diabetes we can improve:  Hemoglobin A1c Lab Results   Component Value Date    A1C 8.0 11/21/2019    A1C 7.2 04/16/2019    A1C 6.4 11/06/2018    A1C 6.2 02/27/2018    A1C 5.5 12/13/2013    Goal range is under 8. Best is 6.5 to 7   Blood Pressure 102/60 Goal to keep less than 140/90   Tobacco  reports that he has never smoked. He has never used smokeless tobacco. Goal to abstain from tobacco   Aspirin yes Aspirin reduces risk of heart disease and stroke   ACE/ARB Start lisinopril These  medications reduce risk of kidney disease   Cholesterol crestor Statins reduce risk of heart disease and stroke   Eye Exam annual Annual diabetic eye exam   Healthy weight Body mass index is 34.43 kg/m . Goal BMI under 30, best is under 25.      -- I'm trying to exercise daily (goal at least 20 min/day) with moderate aerobic activity   -- Eat healthy (resources from ADA at http://www.diabetes.org/)   -- I'm taking good care of my feet. Consider seeing the Podiatrist   -- Check blood sugars as directed, record in log book and bring to every appointment   -- Goal sugar before breakfast: under 140   -- Goal sugar 2 hours after supper: under 170   -- Next diabetes lab draw: 3-6 months   -- Next diabetes office visit: 3-6 months       -- Flu and pneumonia   -- Get the new shingles vaccine series from a nurse-only visit, pharmacy or [facility] ([facility] RN).        Your BMI is Body mass index is 34.43 kg/m .  (BMI ranges: Normal 18.5 - 25, Overweight 25 - 30, Obesity greater than 30,     Morbid Obesity greater than 40 or greater than 35 with associated conditions.)    Facts about losing weight:   -- Overweight and Obesity increase your risk for developing diabetes, high blood pressure and stroke, and shorten your life.   -- 90% of weight loss comes from dietary changes, only 10% from exercise    What should I do?   -- Work on 5-10% weight loss   -- Focus on a few healthy dietary changes   -- Eat more fresh fruits and vegetables, and fewer carbohydrates   -- Cut out all calorie-containing beverages (milk, juice, alcohol, etc)   -- Exercise every day   -- Weigh yourself once a week   -- Consider the DASH Diet (http://http://bit.ly/DASHDiet - redirects to the NIH)   -- Consider Weight Watchers (http://www.weightwatchers.com)   -- Consider My Fitness Pal (iOS, Android, http://www.Olerypal.com)            Return in about 3 months (around 3/4/2021) for med management.    SignedMadhu MD, FAAP,  FACP  Internal Medicine & Pediatrics

## 2020-12-04 NOTE — PROGRESS NOTES
Previous A1C is at goal of <8  Lab Results   Component Value Date    A1C 8.0 11/21/2019    A1C 7.2 04/16/2019    A1C 6.4 11/06/2018    A1C 6.2 02/27/2018    A1C 5.5 12/13/2013     Urine microalbumin:creatine: N/A  Foot exam 12/16/19 DUE   Eye exam 12/1/2020    Tobacco User No  Patient is on a daily aspirin  Patient is on a Statin.  Blood pressure today of:     BP Readings from Last 1 Encounters:   12/04/20 102/60      is at the goal of <139/89 for diabetics.    Diana Perez MA on 12/4/2020 at 11:13 AM

## 2020-12-04 NOTE — NURSING NOTE
"Chief Complaint   Patient presents with     Establish Care     Diabetes     Patient is here to establish care and follow up on diabetes    Initial /60 (BP Location: Right arm, Patient Position: Sitting, Cuff Size: Adult Large)   Pulse 51   Temp 97.3  F (36.3  C) (Tympanic)   Resp 16   Ht 1.842 m (6' 0.5\")   Wt 116.8 kg (257 lb 6.4 oz)   SpO2 96%   BMI 34.43 kg/m   Estimated body mass index is 34.43 kg/m  as calculated from the following:    Height as of this encounter: 1.842 m (6' 0.5\").    Weight as of this encounter: 116.8 kg (257 lb 6.4 oz).  Medication Reconciliation: complete    Diana Perez MA     Immunization Documentation    Prior to Immunization administration, verified patients identity using patient's name and date of birth. Please see IMMUNIZATIONS  and order for additional information.  Patient / Parent instructed to remain in clinic for 15 minutes and report any adverse reaction to staff immediately.    Was the entire amount of vaccines given used? Yes    Diana Perez MA  12/4/2020   11:58 AM    "

## 2020-12-07 LAB
HCV AB SERPL QL IA: NONREACTIVE
HIV 1+2 AB+HIV1 P24 AG SERPL QL IA: NONREACTIVE

## 2020-12-21 LAB — COLOGUARD-ABSTRACT: NEGATIVE

## 2020-12-30 ENCOUNTER — MYC MEDICAL ADVICE (OUTPATIENT)
Dept: PEDIATRICS | Facility: OTHER | Age: 55
End: 2020-12-30

## 2020-12-30 NOTE — TELEPHONE ENCOUNTER
Patient established care on 12/4/20 with Dr Alfaro.    Shot:  I believe he is referring to the shingles vaccine.  This is not currently ordered for him but was suggested.  Sore throat:  Should he be seen here in clinic or in the [facility] to be evaluated?  Madison Ceballos CMA ([facility])

## 2020-12-31 NOTE — TELEPHONE ENCOUNTER
The sore throat is probably viral.  I'd need to see him first before antibiotics.  He can get the shingles vaccine without an order, it's on the protocol.    Signed, Madhu Alfaro MD, FAAP, FACP  Internal Medicine & Pediatrics

## 2021-01-05 ENCOUNTER — PATIENT OUTREACH (OUTPATIENT)
Dept: EDUCATION SERVICES | Facility: OTHER | Age: 56
End: 2021-01-05
Attending: INTERNAL MEDICINE
Payer: COMMERCIAL

## 2021-01-05 ENCOUNTER — TELEPHONE (OUTPATIENT)
Dept: EDUCATION SERVICES | Facility: OTHER | Age: 56
End: 2021-01-05

## 2021-01-05 DIAGNOSIS — E11.65 UNCONTROLLED TYPE 2 DIABETES MELLITUS WITH HYPERGLYCEMIA (H): Primary | ICD-10-CM

## 2021-01-05 PROCEDURE — G0108 DIAB MANAGE TRN  PER INDIV: HCPCS | Mod: TEL

## 2021-01-05 RX ORDER — FLASH GLUCOSE SENSOR
KIT MISCELLANEOUS
Qty: 2 EACH | Refills: 11 | Status: SHIPPED | OUTPATIENT
Start: 2021-01-05

## 2021-01-05 NOTE — TELEPHONE ENCOUNTER
"Patient interested in using FreeStyle Maryann CGM to help find glucose trends.    Current prerequisites for insurance coverage of CGM:  1.  Patient has diabetes:  YES  2.  Patient self-checks BG at least 4 x daily:  NO  3.  Patient is utilizing an insulin pump or administers 3 or more injections of insulin per day:  NO.      4.  Patient is using a sliding scale or requires frequent adjustment of insulin on the basis of BGM or CGM testing results:  NO     Patient meets 1 of 4 required criteria for insurance coverage of CGM.     He understands he does not meet insurance guidelines for coverage of CGM at this time, but would like a prescription sent to [facility], \"I may just pay out of pocket.\"    Recommend FreeStyle Maryann 14-day CGM system.  Patient states he will use his cell phone for Hertel, will not need Hertel device, just sensors.    If accepted as written, please sign pending orders.    Thank you,    Sandra Damon RN, BSN, [facility]  1/5/2021 11:39 AM         "

## 2021-01-05 NOTE — PROGRESS NOTES
"Bucky Chavez is a 55 year old male who is being evaluated via a billable telephone visit.       The patient has been notified of following:      \"This telephone visit will be conducted via a call between you and your physician/provider. We have found that certain health care needs can be provided without the need for a physical exam.  This service lets us provide the care you need with a short phone conversation.  If a prescription is necessary we can send it directly to your pharmacy.  If lab work is needed we can place an order for that and you can then stop by our lab to have the test done at a later time.     Telephone visits are billed at different rates depending on your insurance coverage. During this emergency period, for some insurers they may be billed the same as an in-person visit.  Please reach out to your insurance provider with any questions.     If during the course of the call the physician/provider feels a telephone visit is not appropriate, you will not be charged for this service.\"     Patient has given verbal consent for Telephone visit?  YES     What phone number would you like to be contacted at? [phone]     How would you like to obtain your AVS? Mail a copy     Phone call duration: 60 minutes     Sandra Damon RN    DBED Telephone Visit:   Diabetes Education Progress Note - Individual Education    Referring Medical Provider:  Dr. Madhu Alfaro    SUBJECTIVE: Bucky Chavez is a 55 year old male who has Type 2 Diabetes diabetes and visits, by telephone, for Individual Diabetes Education.    Patient shares history of DM diagnosis about 10 years ago when symptomatic with BG >700 mg/dL.  He states he was started on insulin injections, but transferred to an insulin pump within three months.  Insulin pump was discontinued after 5 years and he has now been off the pump for the past 5 years.  \"They switched me to oral medications as my pancreas started working again.\"      Patient notes lately BG " elevated average  - 180 and evening  - 250 (recent range of BG:  Lowest 120 to highest 260 mg/dL.)    Current Diabetes Management per Patient:  Diabetes medications   yes:     Diabetes Medication(s)     Biguanides       metFORMIN (GLUCOPHAGE-XR) 500 MG 24 hr tablet    Take 4 tablets (2,000 mg) by mouth At Bedtime    Sulfonylureas       glipiZIDE (GLUCOTROL XL) 5 MG 24 hr tablet    Take 1 tablet (5 mg) by mouth daily          Current monitoring regimen: home blood tests - twice daily      Individual Assessed Needs Include:    Diabetes Disease Process  Overview of DM  Incorporating Nutrition Management into Lifestyle  Prevention, detection and treatment of acute complications  Prevention, detection, and treatment of Chronic Complications  Developing Strategies to promote Health/Change Behavior      Education included:     Diabetes Disease Process  Nutrition/Carbohydrate counting  Physical Activity  Diabetes Medications  Glucose Monitoring  Target BGs  Hyperglycemia/Hypoglycemia  Chronic Complications  Goal Setting  Optimal Diabetes Care  Psychosocial Strategies and Health/Change Behavior Strategies    Discussed D5 Health Goals and patient has met 4 of 5 goals at this time.  (BP less than 140/90, ASA therapy as recommended, statin therapy as recommended, A1c less than 8%, tobacco free).    Per patient report:   Patient is checking BG twice daily.    (BG target:  FBG/Pre-meal 70 - 130 and 2-hr PP less than 180)      Average BG:  Fasting 160 and bedtime 225 mg/dl.     Current HbA1c 8.2%.  HbA1c target:  Less than 7%.    Patient interested in using FreeStyle Maryann CGM to help find glucose trends.    Current prerequisites for insurance coverage of CGM:  1.  Patient has diabetes:  YES  2.  Patient self-checks BG at least 4 x daily:  NO  3.  Patient is utilizing an insulin pump or administers 3 or more injections of insulin per day:  NO.      4.  Patient is using a sliding scale or requires frequent adjustment  "of insulin on the basis of BGM or CGM testing results:  NO    Patient meets 1 of 4 required criteria for insurance coverage of CGM.    He understands he does not meet insurance guidelines for coverage of CGM at this time, but would like a prescription sent to [facility], \"I may just pay out of pocket.\"    Patient interested in [employer] BluCon device to receive alerts/alarms when BG are out of target with FreeStyle Maryann CGM system.  Email sent for further information and coupon to purchase from [employer].    Stressed importance of testing BG daily to help keep tight control of DM2, helping to minimize risk for possible complications of uncontrolled diabetes.Discussed benefits of keeping A1c under 7% and encouraged patient to continue testing BG daily.    Carbohydrate Recommendations:  Discussed the low carb plan to help decrease weight, improve blood pressure, improve A1c, decrease triglycerides and increase good HDL cholesterol.  These benefits were summarized from the ADA Consensus report in 2019.      Begin carbohydrate counting, low carb plan:  Up to ~ 15 grams with breakfast, 30 grams with lunch and 30 grams with supper.       A key strategy in this plan is, along with medication need, to participate in low to moderate physical activity, such as walking, working up to a minimum of 30 minutes most days, as tolerated.  Patient's current physical activity habits include walking and working for 120 minutes  Daily or almost daily.    Educational Handouts Mailed:  D5 Health Goals, My Food Plan, ADA 2019 Nutrition Consensus Report Review, Activity Pyramid    PLAN:    Medication recommendations:continue current medication regimen unchanged    Patient will test blood glucose as above or as previously directed.  FreeStyle Maryann CGM supplies sent eRx to pharmacy per PCP approval.    Patient will begin low CHO meal plan to help improve BG results with possible weight loss benefit.    Patient encouraged to develop " physical activity plan or continue with current plan.    Patient will follow up with provider as directed by PCP.    Phone Time spent for individual education was 60 minutes.      Sandra Damon RN, BSN, CDCES  1/5/2021 11:12 AM

## 2021-02-23 ENCOUNTER — TRANSFERRED RECORDS (OUTPATIENT)
Dept: HEALTH INFORMATION MANAGEMENT | Facility: OTHER | Age: 56
End: 2021-02-23

## 2021-03-25 ENCOUNTER — IMMUNIZATION (OUTPATIENT)
Dept: FAMILY MEDICINE | Facility: OTHER | Age: 56
End: 2021-03-25
Attending: FAMILY MEDICINE
Payer: COMMERCIAL

## 2021-03-25 PROCEDURE — 91300 PR COVID VAC PFIZER DIL RECON 30 MCG/0.3 ML IM: CPT

## 2021-04-15 ENCOUNTER — IMMUNIZATION (OUTPATIENT)
Dept: FAMILY MEDICINE | Facility: OTHER | Age: 56
End: 2021-04-15
Attending: FAMILY MEDICINE
Payer: COMMERCIAL

## 2021-04-15 PROCEDURE — 0002A PR COVID VAC PFIZER DIL RECON 30 MCG/0.3 ML IM: CPT

## 2021-04-20 ENCOUNTER — TELEPHONE (OUTPATIENT)
Dept: PEDIATRICS | Facility: OTHER | Age: 56
End: 2021-04-20

## 2021-04-20 DIAGNOSIS — E11.65 UNCONTROLLED TYPE 2 DIABETES MELLITUS WITH HYPERGLYCEMIA (H): Primary | Chronic | ICD-10-CM

## 2021-04-20 NOTE — TELEPHONE ENCOUNTER
ACC Review   Please call  Bucky Chavez.      ICD-10-CM    1. Uncontrolled type 2 diabetes mellitus with hyperglycemia (H)  E11.65 Hemoglobin A1c     Basic metabolic panel        -- Schedule a lab only visit   -- Obtain up to date eye exam report   -- Schedule a diabetes medication management visit, in person or video    Signed, Madhu Alfaro MD, FAAP, FACP  Internal Medicine & Pediatrics

## 2021-04-22 ENCOUNTER — OFFICE VISIT (OUTPATIENT)
Dept: PEDIATRICS | Facility: OTHER | Age: 56
End: 2021-04-22
Attending: INTERNAL MEDICINE
Payer: COMMERCIAL

## 2021-04-22 VITALS
HEART RATE: 60 BPM | BODY MASS INDEX: 34.17 KG/M2 | SYSTOLIC BLOOD PRESSURE: 120 MMHG | RESPIRATION RATE: 16 BRPM | TEMPERATURE: 96.9 F | WEIGHT: 257.8 LBS | OXYGEN SATURATION: 98 % | DIASTOLIC BLOOD PRESSURE: 84 MMHG | HEIGHT: 73 IN

## 2021-04-22 DIAGNOSIS — E11.65 UNCONTROLLED TYPE 2 DIABETES MELLITUS WITH HYPERGLYCEMIA (H): Primary | ICD-10-CM

## 2021-04-22 DIAGNOSIS — E78.5 HYPERLIPIDEMIA LDL GOAL <100: ICD-10-CM

## 2021-04-22 LAB
ANION GAP SERPL CALCULATED.3IONS-SCNC: 8 MMOL/L (ref 3–14)
BUN SERPL-MCNC: 12 MG/DL (ref 7–25)
CALCIUM SERPL-MCNC: 9.4 MG/DL (ref 8.6–10.3)
CHLORIDE SERPL-SCNC: 104 MMOL/L (ref 98–107)
CO2 SERPL-SCNC: 24 MMOL/L (ref 21–31)
CREAT SERPL-MCNC: 1.07 MG/DL (ref 0.7–1.3)
GFR SERPL CREATININE-BSD FRML MDRD: 72 ML/MIN/{1.73_M2}
GLUCOSE SERPL-MCNC: 151 MG/DL (ref 70–105)
HBA1C MFR BLD: 7.5 % (ref 4–6)
POTASSIUM SERPL-SCNC: 4.7 MMOL/L (ref 3.5–5.1)
SODIUM SERPL-SCNC: 136 MMOL/L (ref 134–144)

## 2021-04-22 PROCEDURE — 99214 OFFICE O/P EST MOD 30 MIN: CPT | Performed by: INTERNAL MEDICINE

## 2021-04-22 PROCEDURE — G0463 HOSPITAL OUTPT CLINIC VISIT: HCPCS

## 2021-04-22 PROCEDURE — 36415 COLL VENOUS BLD VENIPUNCTURE: CPT | Mod: ZL | Performed by: INTERNAL MEDICINE

## 2021-04-22 PROCEDURE — 83036 HEMOGLOBIN GLYCOSYLATED A1C: CPT | Mod: ZL | Performed by: INTERNAL MEDICINE

## 2021-04-22 PROCEDURE — 80048 BASIC METABOLIC PNL TOTAL CA: CPT | Mod: ZL | Performed by: INTERNAL MEDICINE

## 2021-04-22 RX ORDER — ROSUVASTATIN CALCIUM 5 MG/1
5 TABLET, COATED ORAL DAILY
Qty: 90 TABLET | Refills: 3 | Status: SHIPPED | OUTPATIENT
Start: 2021-04-22 | End: 2022-03-07

## 2021-04-22 RX ORDER — METFORMIN HCL 500 MG
2000 TABLET, EXTENDED RELEASE 24 HR ORAL AT BEDTIME
Qty: 360 TABLET | Refills: 3 | Status: SHIPPED | OUTPATIENT
Start: 2021-04-22 | End: 2022-03-07

## 2021-04-22 RX ORDER — LISINOPRIL 2.5 MG/1
2.5 TABLET ORAL DAILY
Qty: 90 TABLET | Refills: 3 | Status: SHIPPED | OUTPATIENT
Start: 2021-04-22 | End: 2021-11-23

## 2021-04-22 RX ORDER — GLIPIZIDE 2.5 MG/1
5 TABLET, EXTENDED RELEASE ORAL DAILY
Qty: 180 TABLET | Refills: 3 | Status: SHIPPED | OUTPATIENT
Start: 2021-04-22 | End: 2021-10-01

## 2021-04-22 ASSESSMENT — MIFFLIN-ST. JEOR: SCORE: 2063.25

## 2021-04-22 ASSESSMENT — PAIN SCALES - GENERAL: PAINLEVEL: MODERATE PAIN (4)

## 2021-04-22 NOTE — NURSING NOTE
"Patient presents to the clinic today for a diabetic check.  Petty Rahman LPN 4/22/2021   10:48 AM    Previous A1C is at goal of <8  Lab Results   Component Value Date    A1C 8.2 12/04/2020    A1C 8.0 11/21/2019    A1C 7.2 04/16/2019    A1C 6.4 11/06/2018    A1C 6.2 02/27/2018     Urine microalbumin:creatine: n/a  Foot exam Yearly  Eye exam 02/2021    Tobacco User No  Patient is on a daily aspirin  Patient is on a Statin.  Blood pressure today of:     BP Readings from Last 1 Encounters:   04/22/21 120/84      is at the goal of <139/89 for diabetics.    Petty Rahman LPN on 4/22/2021 at 10:48 AM    Chief Complaint   Patient presents with     Diabetes     Diabetic Check       Initial /84 (BP Location: Right arm, Patient Position: Sitting, Cuff Size: Adult Regular)   Pulse 60   Temp 96.9  F (36.1  C) (Tympanic)   Resp 16   Ht 1.854 m (6' 1\")   Wt 116.9 kg (257 lb 12.8 oz)   SpO2 98%   BMI 34.01 kg/m   Estimated body mass index is 34.01 kg/m  as calculated from the following:    Height as of this encounter: 1.854 m (6' 1\").    Weight as of this encounter: 116.9 kg (257 lb 12.8 oz).  Medication Reconciliation: complete  Petty Rahman LPN      "

## 2021-04-22 NOTE — PATIENT INSTRUCTIONS
Aspects of Diabetes we can improve:  Hemoglobin A1c Lab Results   Component Value Date    A1C 8.2 12/04/2020    A1C 8.0 11/21/2019    A1C 7.2 04/16/2019    A1C 6.4 11/06/2018    A1C 6.2 02/27/2018    Goal range is under 8. Best is 6.5 to 7   Blood Pressure 120/84 Goal to keep less than 140/90   Tobacco  reports that he has never smoked. He has never used smokeless tobacco. Goal to abstain from tobacco   Aspirin yes Aspirin reduces risk of heart disease and stroke   ACE/ARB lisinopril These medications reduce risk of kidney disease   Cholesterol Crestor Statins reduce risk of heart disease and stroke   Eye Exam 2/21 Annual diabetic eye exam   Healthy weight Body mass index is 34.01 kg/m . Goal BMI under 30, best is under 25.      -- I'm trying to exercise daily (goal at least 20 min/day) with moderate aerobic activity   -- Eat healthy (resources from ADA at http://www.diabetes.org/)   -- I'm taking good care of my feet. Consider seeing the Podiatrist   -- Check blood sugars as directed, record in log book and bring to every appointment   -- Goal sugar before breakfast: under 140   -- Goal sugar 2 hours after supper: under 170   -- Next diabetes lab draw: 3 months   -- Next diabetes office visit: 3 months    Your BMI is Body mass index is 34.01 kg/m .  (BMI ranges: Normal 18.5 - 25, Overweight 25 - 30, Obesity greater than 30,     Morbid Obesity greater than 40 or greater than 35 with associated conditions.)    Facts about losing weight:   -- Overweight and Obesity increase your risk for developing diabetes, high blood pressure and stroke, and shorten your life.   -- 90% of weight loss comes from dietary changes, only 10% from exercise    What should I do?   -- Work on 5-10% weight loss   -- Focus on a few healthy dietary changes   -- Eat more fresh fruits and vegetables, and fewer carbohydrates   -- Cut out all calorie-containing beverages (milk, juice, alcohol, etc)   -- Exercise every day   -- Weigh yourself once a  week   -- Learn about DASH Diet for dietary ways to reduce blood pressure  1. Google: [facility] [[email or web address]] DASH diet  2. [facility] [[email or web address]] site: https://www.nhlbi.nih.gov/health-topics/dash-eating-plan  3. PDF from [facility] [[email or web address]]: https://www.nhlbi.nih.gov/files/docs/public/heart/new_dash.pdf   -- Consider Weight Watchers (http://www.weightwatch[place] .[employer])   -- Consider My Fitness Pal (iOS, Android, http://www.myfitnesspal.com)           -- Try nasal saline irrigation (with distilled water)    Nasal saline spray    NeilMed sinus rinse    Neti pot   -- Start nasal steroid spray daily (eg Nasacort, Flonase)   -- When using steroid spray: tilt head forward, spray away from center septum, don't sniff deeply during inhalation.   -- Steroid spray works best when used consistently, not as needed.   -- Okay to start daily Claritin/Allegra/Zyrtec (without -D), can help for sneezing, itchy eyes, etc.   -- Okay to use diphenhydramine (Benadryl) as needed for sneezing, nasal congestion, can cause dry mouth, urinary retention, blurry vision, constipation   -- Okay to briefly use oxymetazoline (Afrin) 2 sprays both nostrils, nightly for 3-4 days, then stop as can cause rebound congestion   -- Shower/bathe before bed to wash pollen away   -- Elevate head of bed to facilitate sinus drainage   -- Consider getting a HEPA filter   -- Use a cool mist humidifier during the dry season, clean weekly with vinegar

## 2021-04-22 NOTE — PROGRESS NOTES
"Subjective   Bucky Chavez is a 56 year old male who presents for diabetes check.  Every time he takes glipizide and then spends a lot of energy working outside he tends to go low particularly if he does not eat lunch.  His sugar goes down to around 60.  Mornings have been 130-140 with a high of 250.  Typically higher after meals and in the evening.    Objective   Vitals: /84 (BP Location: Right arm, Patient Position: Sitting, Cuff Size: Adult Regular)   Pulse 60   Temp 96.9  F (36.1  C) (Tympanic)   Resp 16   Ht 1.854 m (6' 1\")   Wt 116.9 kg (257 lb 12.8 oz)   SpO2 98%   BMI 34.01 kg/m        Review and Analysis of Data   I personally reviewed the following:  External notes: No  Results: Yes  Use of an independent historian: No  Independent review of a test performed by another physician: No  Discussion of management with another physician: No  Moderate risk of morbidity from additional diagnostic testing and/or treatment.    Assessment & Plan   1. Uncontrolled type 2 diabetes mellitus with hyperglycemia (H)  It sounds as though his A1c will be improved although not at the best goal of 6.5-7.  I recommend lifestyle modification including weight loss, daily physical exercise and reduce carbohydrate intake.  On reducing the glipizide to 2.5 so that he can take a lower dose on days when he anticipates being very active.    - Hemoglobin A1c; Future  - Basic metabolic panel; Future  - glipiZIDE (GLUCOTROL XL) 2.5 MG 24 hr tablet; Take 2 tablets (5 mg) by mouth daily  Dispense: 180 tablet; Refill: 3  - metFORMIN (GLUCOPHAGE-XR) 500 MG 24 hr tablet; Take 4 tablets (2,000 mg) by mouth At Bedtime  Dispense: 360 tablet; Refill: 3  - lisinopril (ZESTRIL) 2.5 MG tablet; Take 1 tablet (2.5 mg) by mouth daily  Dispense: 90 tablet; Refill: 3  - Basic metabolic panel  - Hemoglobin A1c    2. Hyperlipidemia LDL goal <100  - rosuvastatin (CRESTOR) 5 MG tablet; Take 1 tablet (5 mg) by mouth daily  Dispense: 90 tablet; " Refill: 3      Patient Instructions     Aspects of Diabetes we can improve:  Hemoglobin A1c Lab Results   Component Value Date    A1C 8.2 12/04/2020    A1C 8.0 11/21/2019    A1C 7.2 04/16/2019    A1C 6.4 11/06/2018    A1C 6.2 02/27/2018    Goal range is under 8. Best is 6.5 to 7   Blood Pressure 120/84 Goal to keep less than 140/90   Tobacco  reports that he has never smoked. He has never used smokeless tobacco. Goal to abstain from tobacco   Aspirin yes Aspirin reduces risk of heart disease and stroke   ACE/ARB lisinopril These medications reduce risk of kidney disease   Cholesterol Crestor Statins reduce risk of heart disease and stroke   Eye Exam 2/21 Annual diabetic eye exam   Healthy weight Body mass index is 34.01 kg/m . Goal BMI under 30, best is under 25.      -- I'm trying to exercise daily (goal at least 20 min/day) with moderate aerobic activity   -- Eat healthy (resources from ADA at http://www.diabetes.org/)   -- I'm taking good care of my feet. Consider seeing the Podiatrist   -- Check blood sugars as directed, record in log book and bring to every appointment   -- Goal sugar before breakfast: under 140   -- Goal sugar 2 hours after supper: under 170   -- Next diabetes lab draw: 3 months   -- Next diabetes office visit: 3 months    Your BMI is Body mass index is 34.01 kg/m .  (BMI ranges: Normal 18.5 - 25, Overweight 25 - 30, Obesity greater than 30,     Morbid Obesity greater than 40 or greater than 35 with associated conditions.)    Facts about losing weight:   -- Overweight and Obesity increase your risk for developing diabetes, high blood pressure and stroke, and shorten your life.   -- 90% of weight loss comes from dietary changes, only 10% from exercise    What should I do?   -- Work on 5-10% weight loss   -- Focus on a few healthy dietary changes   -- Eat more fresh fruits and vegetables, and fewer carbohydrates   -- Cut out all calorie-containing beverages (milk, juice, alcohol, etc)   --  Exercise every day   -- Weigh yourself once a week   -- Learn about DASH Diet for dietary ways to reduce blood pressure  1. Google: [facility] DASH diet  2. [facility] site: https://www.nhlbi.nih.gov/health-topics/dash-eating-plan  3. PDF from [facility]: https://www.nhlbi.nih.gov/files/docs/public/heart/new_dash.pdf   -- Consider Weight Watchers (http://www.weightAccipiter Radar.[employer]Power[employer])   -- Consider My Fitness Pal (iOS, Android, http://www.[employer]Power[employer])           -- Try nasal saline irrigation (with distilled water)    Nasal saline spray    NeilMed sinus rinse    Neti pot   -- Start nasal steroid spray daily (eg Nasacort, Flonase)   -- When using steroid spray: tilt head forward, spray away from center septum, don't sniff deeply during inhalation.   -- Steroid spray works best when used consistently, not as needed.   -- Okay to start daily Claritin/Allegra/Zyrtec (without -D), can help for sneezing, itchy eyes, etc.   -- Okay to use diphenhydramine (Benadryl) as needed for sneezing, nasal congestion, can cause dry mouth, urinary retention, blurry vision, constipation   -- Okay to briefly use oxymetazoline (Afrin) 2 sprays both nostrils, nightly for 3-4 days, then stop as can cause rebound congestion   -- Shower/bathe before bed to wash pollen away   -- Elevate head of bed to facilitate sinus drainage   -- Consider getting a HEPA filter   -- Use a cool mist humidifier during the dry season, clean weekly with vinegar        Return in about 3 months (around 7/22/2021) for diabetes.    Signed, Madhu Alfaro MD, FAAP, FACP  Internal Medicine & Pediatrics

## 2021-05-04 ENCOUNTER — TELEPHONE (OUTPATIENT)
Dept: PEDIATRICS | Facility: OTHER | Age: 56
End: 2021-05-04

## 2021-05-04 NOTE — TELEPHONE ENCOUNTER
Per my last note glucoses mostly in range with glipizide 5 mg.     -- No change.    Signed, Madhu Alfaro MD, FAAP, FACP  Internal Medicine & Pediatrics

## 2021-05-04 NOTE — TELEPHONE ENCOUNTER
Writer spoke with pharmacy who would like clarification on glipizide order. Chantale at pharmacy states patient was expecting to lower his dose to the 2.5 mg instead of 5 mg due to recent blood sugars.  Please advise.  Kelin Beard LPN on 5/4/2021 at 1:33 PM

## 2021-05-04 NOTE — TELEPHONE ENCOUNTER
Needs clarification of dosage of pt's prescription for Glipizide.  Please call       Mehul Wise on 5/4/2021 at 1:24 PM

## 2021-09-18 ENCOUNTER — HEALTH MAINTENANCE LETTER (OUTPATIENT)
Age: 56
End: 2021-09-18

## 2021-10-01 ENCOUNTER — OFFICE VISIT (OUTPATIENT)
Dept: PEDIATRICS | Facility: OTHER | Age: 56
End: 2021-10-01
Attending: INTERNAL MEDICINE
Payer: COMMERCIAL

## 2021-10-01 VITALS
DIASTOLIC BLOOD PRESSURE: 74 MMHG | SYSTOLIC BLOOD PRESSURE: 108 MMHG | BODY MASS INDEX: 33.78 KG/M2 | TEMPERATURE: 97.1 F | OXYGEN SATURATION: 98 % | RESPIRATION RATE: 20 BRPM | HEART RATE: 56 BPM | WEIGHT: 256 LBS

## 2021-10-01 DIAGNOSIS — E11.9 CONTROLLED TYPE 2 DIABETES MELLITUS WITHOUT COMPLICATION, WITHOUT LONG-TERM CURRENT USE OF INSULIN (H): Primary | ICD-10-CM

## 2021-10-01 DIAGNOSIS — Z23 NEED FOR VACCINATION: ICD-10-CM

## 2021-10-01 DIAGNOSIS — Z23 NEED FOR PROPHYLACTIC VACCINATION AND INOCULATION AGAINST INFLUENZA: ICD-10-CM

## 2021-10-01 PROBLEM — E11.65 UNCONTROLLED TYPE 2 DIABETES MELLITUS WITH HYPERGLYCEMIA (H): Chronic | Status: RESOLVED | Noted: 2020-12-04 | Resolved: 2021-10-01

## 2021-10-01 LAB
ANION GAP SERPL CALCULATED.3IONS-SCNC: 5 MMOL/L (ref 3–14)
BUN SERPL-MCNC: 15 MG/DL (ref 7–25)
CALCIUM SERPL-MCNC: 9.3 MG/DL (ref 8.6–10.3)
CHLORIDE BLD-SCNC: 106 MMOL/L (ref 98–107)
CHOLEST SERPL-MCNC: 98 MG/DL
CO2 SERPL-SCNC: 27 MMOL/L (ref 21–31)
CREAT SERPL-MCNC: 1.01 MG/DL (ref 0.7–1.3)
FASTING STATUS PATIENT QL REPORTED: NORMAL
GFR SERPL CREATININE-BSD FRML MDRD: 84 ML/MIN/1.73M2
GLUCOSE BLD-MCNC: 105 MG/DL (ref 70–105)
HBA1C MFR BLD: 7 % (ref 4–6.2)
HDLC SERPL-MCNC: 44 MG/DL (ref 23–92)
HOLD SPECIMEN: NORMAL
LDLC SERPL CALC-MCNC: 39 MG/DL
NONHDLC SERPL-MCNC: 54 MG/DL
POTASSIUM BLD-SCNC: 4.4 MMOL/L (ref 3.5–5.1)
SODIUM SERPL-SCNC: 138 MMOL/L (ref 134–144)
TRIGL SERPL-MCNC: 73 MG/DL

## 2021-10-01 PROCEDURE — 99214 OFFICE O/P EST MOD 30 MIN: CPT | Performed by: INTERNAL MEDICINE

## 2021-10-01 PROCEDURE — 36415 COLL VENOUS BLD VENIPUNCTURE: CPT | Mod: ZL | Performed by: INTERNAL MEDICINE

## 2021-10-01 PROCEDURE — G0463 HOSPITAL OUTPT CLINIC VISIT: HCPCS | Mod: 25

## 2021-10-01 PROCEDURE — 80061 LIPID PANEL: CPT | Mod: ZL | Performed by: INTERNAL MEDICINE

## 2021-10-01 PROCEDURE — 83036 HEMOGLOBIN GLYCOSYLATED A1C: CPT | Mod: ZL | Performed by: INTERNAL MEDICINE

## 2021-10-01 PROCEDURE — 90471 IMMUNIZATION ADMIN: CPT

## 2021-10-01 PROCEDURE — 80048 BASIC METABOLIC PNL TOTAL CA: CPT | Mod: ZL | Performed by: INTERNAL MEDICINE

## 2021-10-01 RX ORDER — GLIPIZIDE 2.5 MG/1
2.5 TABLET, EXTENDED RELEASE ORAL 2 TIMES DAILY
Qty: 180 TABLET | Refills: 3 | Status: SHIPPED | OUTPATIENT
Start: 2021-10-01 | End: 2022-03-07

## 2021-10-01 RX ORDER — ZOSTER VACCINE RECOMBINANT, ADJUVANTED 50 MCG/0.5
1 KIT INTRAMUSCULAR ONCE
Qty: 0.5 ML | Refills: 1 | Status: SHIPPED | OUTPATIENT
Start: 2021-10-01 | End: 2021-10-01

## 2021-10-01 ASSESSMENT — PAIN SCALES - GENERAL: PAINLEVEL: SEVERE PAIN (6)

## 2021-10-01 NOTE — PROGRESS NOTES
Subjective   Bucky Chavez is a 56 year old male who presents for diabetes follow-up.  Home blood sugars have been around 150 in the morning and around 162 hours after a meal.  He switched his glipizide XL to 2.5 mg twice daily which he feels like helps him better.    Objective   Vitals: /74 (BP Location: Right arm, Patient Position: Sitting, Cuff Size: Adult Large)   Pulse 56   Temp 97.1  F (36.2  C) (Tympanic)   Resp 20   Wt 116.1 kg (256 lb)   SpO2 98%   BMI 33.78 kg/m        Review and Analysis of Data   I personally reviewed the following:  External notes: No  Results: Yes Most recent diabetic labs are reviewed  Use of an independent historian: No  Independent review of a test performed by another physician: No  Discussion of management with another physician: No  Low risk of morbidity from additional diagnostic testing and/or treatment.    Assessment & Plan   1. Controlled type 2 diabetes mellitus without complication, without long-term current use of insulin (H)  At goal, no change.  - Basic metabolic panel; Future  - Hemoglobin A1c; Future  - Lipid Profile; Future  - Basic metabolic panel  - Hemoglobin A1c  - Lipid Profile    2. Need for vaccination  I also recommend he get the COVID-19 booster after 6 months which will be in about 2 weeks.  - zoster vaccine recombinant adjuvanted (SHINGRIX) injection; Inject 0.5 mLs into the muscle once for 1 dose  Dispense: 0.5 mL; Refill: 1    3. Need for prophylactic vaccination and inoculation against influenza  - INFLUENZA QUAD, RECOMBINANT, P-FREE (RIV4) (FLUBLOK)    Patient Instructions     Aspects of Diabetes we can improve:  Hemoglobin A1c Lab Results   Component Value Date    A1C 7.5 04/22/2021    A1C 8.2 12/04/2020    A1C 8.0 11/21/2019    A1C 7.2 04/16/2019    A1C 6.4 11/06/2018    Goal range is under 8. Best is 6.5 to 7   Blood Pressure 108/74 Goal to keep less than 140/90   Tobacco  reports that he has never smoked. He has never used smokeless  tobacco. Goal to abstain from tobacco   Aspirin yes Aspirin reduces risk of heart disease and stroke   ACE/ARB lisinopril These medications reduce risk of kidney disease   Cholesterol rosuvastatin Statins reduce risk of heart disease and stroke   Eye Exam annual Annual diabetic eye exam   Healthy weight Body mass index is 33.78 kg/m . Goal BMI under 30, best is under 25.      -- I'm trying to exercise daily (goal at least 20 min/day) with moderate aerobic activity   -- Eat healthy (resources from ADA at http://www.diabetes.org/)   -- I'm taking good care of my feet. Consider seeing the Podiatrist   -- Check blood sugars as directed, record in log book and bring to every appointment   -- Goal sugar before breakfast: under 140   -- Goal sugar 2 hours after supper: under 170   -- Next diabetes lab draw: 3-6 months   -- Next diabetes office visit: 3-6 months     -- Get the new shingles vaccine series from a nurse-only visit, pharmacy or [facility] ([facility] RN).        Your BMI is Body mass index is 33.78 kg/m .  (BMI ranges: Normal 18.5 - 25, Overweight 25 - 30, Obesity greater than 30,     Morbid Obesity greater than 40 or greater than 35 with associated conditions.)    Facts about losing weight:   -- Overweight and Obesity increase your risk for developing diabetes, high blood pressure and stroke, and shorten your life.   -- 90% of weight loss comes from dietary changes, only 10% from exercise    What should I do?   -- Work on 5-10% weight loss   -- Focus on a few healthy dietary changes   -- Eat more fresh fruits and vegetables, and fewer carbohydrates   -- Cut out all calorie-containing beverages (milk, juice, alcohol, etc)   -- Exercise every day   -- Weigh yourself once a week   -- Learn about DASH Diet for dietary ways to reduce blood pressure  1. Google: NIH DASH diet  2. NIH site: https://www.nhlbi.nih.gov/health-topics/dash-eating-plan  3. PDF from NIH:  https://www.nhlbi.nih.gov/files/docs/public/heart/new_dash.pdf   -- Consider Weight Watchers (http://www.weightwatchers.com)   -- Consider My Fitness Pal (iOS, Android, http://www.Foneshowpal.com)          Patient Education         How to get your COVID-19 vaccine  COVID-19 vaccine is free    1. From [place]  Thanks for reaching out to us. At this time, we are vaccinating all people age 12 and older with the Pfizer COVID vaccine. We no longer have the Lance & Lance vaccine available.     You may make an appointment, walk-in or receive your vaccine after your clinic appointment anytime between 8am-12pm and 1pm-4:10pm Monday - Friday.   To schedule your 1st or 2nd dose appointment please log in to [facility] to see when and where we have openings. You can also schedule an appointment by calling our appointment line at [phone], weekdays 7:00AM - 5:00 PM.   Beginning Thursday, September 30th, we will begin offering appointments for booster doses of the Pfizer COVID-19 vaccine to the groups listed below:     People ages 65 years and older    Residents in long-term care settings    People ages 50 to 64 with certain underlying medical conditions (refer to CDC: People with Certain Medical Conditions)    People ages 18 to 49 who are at high risk for severe COVID-19 due to certain underlying medical conditions (refer to CDC: People with Certain Medical Conditions)    People ages 18 to 64 who are at increased risk for COVID-19 exposure and transmission because of where they live or work    To be eligible to receive a booster dose of the Pfizer COVID-19 vaccine, six months must have passed since the patient's second dose of the Pfizer vaccine. If patient's received the Moderna or Lance & Lance vaccine, they do not need to do anything at this time.     We await more information and official recommendations and will share next steps as we are ready to operationalize these doses. We appreciate your patience as we  work to vaccinate as many people as we can as quickly, safely, and efficiently as possible.    2. From [place] Department [facility], pharmacy or other clinic  Who currently qualifies?    12+    See the FAQ at [place]://[place].gov/[place]id19/vaccine/find-vaccine/community-vaccination-program/faq.jsp    How to schedule?    Schedule on-line via Vaccine Connector at [place]://vaccineconnector.[place].gov/)    Call [phone] or toll free at [phone]        Return in about 3 months (around 1/1/2022) for med management.    Madhu Colin MD, FAAP, FACP  Internal Medicine & Pediatrics

## 2021-10-01 NOTE — NURSING NOTE
"Patient presents to the clinic for diabetes management.      FOOD SECURITY SCREENING QUESTIONS  Hunger Vital Signs:  Within the past 12 months we worried whether our food would run out before we got money to buy more. Never  Within the past 12 months the food we bought just didn't last and we didn't have money to get more. Never    Advance Care Directive on file? no  Advance Care Directive provided to patient? Declined.    Lab Results   Component Value Date    A1C 7.5 04/22/2021    A1C 8.2 12/04/2020    A1C 8.0 11/21/2019    ALBUMIN 4.4 04/16/2019     Previous A1C is at goal of <8  Date of last Foot exam 12-4-2020  Eye exam Yes- Date of last eye exam: April 2021,  Location: [place]  Patient is not a Tobacco User  Patient is on a daily aspirin  Patient is on a Statin.  Blood pressure today of:     BP Readings from Last 1 Encounters:   04/22/21 120/84      is at the goal of <139/89 for diabetics.  Chief Complaint   Patient presents with     Diabetes       Initial /74 (BP Location: Right arm, Patient Position: Sitting, Cuff Size: Adult Large)   Pulse 56   Temp 97.1  F (36.2  C) (Tympanic)   Resp 20   Wt 116.1 kg (256 lb)   SpO2 98%   BMI 33.78 kg/m   Estimated body mass index is 33.78 kg/m  as calculated from the following:    Height as of 4/22/21: 1.854 m (6' 1\").    Weight as of this encounter: 116.1 kg (256 lb).  Medication Reconciliation: complete    Nenita Higgins, JENNIFER             "

## 2021-10-01 NOTE — NURSING NOTE
Immunization Documentation    Prior to Immunization administration, verified patients identity using patient's name and date of birth. Please see IMMUNIZATIONS  and order for additional information.  Patient / Parent instructed to remain in clinic for 15 minutes and report any adverse reaction to staff immediately.    Was entire vial of medication used? Yes  Vial/Syringe: Daysi Higgins LPN  10/1/2021   11:54 AM

## 2021-10-01 NOTE — PATIENT INSTRUCTIONS
Aspects of Diabetes we can improve:  Hemoglobin A1c Lab Results   Component Value Date    A1C 7.5 04/22/2021    A1C 8.2 12/04/2020    A1C 8.0 11/21/2019    A1C 7.2 04/16/2019    A1C 6.4 11/06/2018    Goal range is under 8. Best is 6.5 to 7   Blood Pressure 108/74 Goal to keep less than 140/90   Tobacco  reports that he has never smoked. He has never used smokeless tobacco. Goal to abstain from tobacco   Aspirin yes Aspirin reduces risk of heart disease and stroke   ACE/ARB lisinopril These medications reduce risk of kidney disease   Cholesterol rosuvastatin Statins reduce risk of heart disease and stroke   Eye Exam annual Annual diabetic eye exam   Healthy weight Body mass index is 33.78 kg/m . Goal BMI under 30, best is under 25.      -- I'm trying to exercise daily (goal at least 20 min/day) with moderate aerobic activity   -- Eat healthy (resources from ADA at http://www.diabetes.org/)   -- I'm taking good care of my feet. Consider seeing the Podiatrist   -- Check blood sugars as directed, record in log book and bring to every appointment   -- Goal sugar before breakfast: under 140   -- Goal sugar 2 hours after supper: under 170   -- Next diabetes lab draw: 3-6 months   -- Next diabetes office visit: 3-6 months     -- Get the new shingles vaccine series from a nurse-only visit, pharmacy or [facility] ([facility] RN).        Your BMI is Body mass index is 33.78 kg/m .  (BMI ranges: Normal 18.5 - 25, Overweight 25 - 30, Obesity greater than 30,     Morbid Obesity greater than 40 or greater than 35 with associated conditions.)    Facts about losing weight:   -- Overweight and Obesity increase your risk for developing diabetes, high blood pressure and stroke, and shorten your life.   -- 90% of weight loss comes from dietary changes, only 10% from exercise    What should I do?   -- Work on 5-10% weight loss   -- Focus on a few healthy dietary changes   -- Eat more fresh fruits and vegetables, and fewer  carbohydrates   -- Cut out all calorie-containing beverages (milk, juice, alcohol, etc)   -- Exercise every day   -- Weigh yourself once a week   -- Learn about DASH Diet for dietary ways to reduce blood pressure  1. Google: [facility] DASH diet  2. [facility] site: https://www.nhlbi.nih.gov/health-topics/dash-eating-plan  3. PDF from [facility]: https://www.nhlbi.nih.gov/files/docs/public/heart/new_dash.pdf   -- Consider Weight Watchers (http://www.weightwatchers.com)   -- Consider My Fitness Pal (iOS, Android, http://www.[employer])          Patient Education         How to get your COVID-19 vaccine  COVID-19 vaccine is free    1. From [place]  Thanks for reaching out to us. At this time, we are vaccinating all people age 12 and older with the Pfizer COVID vaccine. We no longer have the Lance & Lance vaccine available.     You may make an appointment, walk-in or receive your vaccine after your clinic appointment anytime between 8am-12pm and 1pm-4:10pm Monday - Friday.   To schedule your 1st or 2nd dose appointment please log in to [employer] to see when and where we have openings. You can also schedule an appointment by calling our appointment line at [phone], weekdays 7:00AM - 5:00 PM.   Beginning Thursday, September 30th, we will begin offering appointments for booster doses of the Pfizer COVID-19 vaccine to the groups listed below:     People ages 65 years and older    Residents in long-term care settings    People ages 50 to 64 with certain underlying medical conditions (refer to CDC: People with Certain Medical Conditions)    People ages 18 to 49 who are at high risk for severe COVID-19 due to certain underlying medical conditions (refer to CDC: People with Certain Medical Conditions)    People ages 18 to 64 who are at increased risk for COVID-19 exposure and transmission because of where they live or work    To be eligible to receive a booster dose of the Pfizer COVID-19 vaccine, six months must have passed  since the patient's second dose of the Pfizer vaccine. If patient's received the Moderna or Lance & Lance vaccine, they do not need to do anything at this time.     We await more information and official recommendations and will share next steps as we are ready to operationalize these doses. We appreciate your patience as we work to vaccinate as many people as we can as quickly, safely, and efficiently as possible.    2. From [facility], pharmacy or other clinic  Who currently qualifies?    12+    See the FAQ at https://[place].gov/covid19/vaccine/find-vaccine/community-vaccination-program/faq.jsp    How to schedule?    Schedule on-line via Vaccine Connector at https://vaccineconnector.mn.gov/)    Call [phone] or toll free at [phone]

## 2021-10-21 ENCOUNTER — IMMUNIZATION (OUTPATIENT)
Dept: FAMILY MEDICINE | Facility: OTHER | Age: 56
End: 2021-10-21
Attending: FAMILY MEDICINE
Payer: COMMERCIAL

## 2021-10-21 PROCEDURE — 91300 PR COVID VAC PFIZER DIL RECON 30 MCG/0.3 ML IM: CPT

## 2021-11-26 ENCOUNTER — TELEPHONE (OUTPATIENT)
Dept: PEDIATRICS | Facility: OTHER | Age: 56
End: 2021-11-26
Payer: COMMERCIAL

## 2021-11-26 NOTE — TELEPHONE ENCOUNTER
I spoke to pt and he states they found the rx.    Ericka Burton, SKYLER ([facility])......................11/26/2021  1:44 PM

## 2021-11-26 NOTE — TELEPHONE ENCOUNTER
Patient was waiting for a RX for Lisinopril 205 mg.  He received all the other RX's but not that one.  They are leaving town tomorrow.  Please call      Mary Stephenson on 11/26/2021 at 8:06 AM

## 2022-01-08 ENCOUNTER — HEALTH MAINTENANCE LETTER (OUTPATIENT)
Age: 57
End: 2022-01-08

## 2022-01-28 ENCOUNTER — VIRTUAL VISIT (OUTPATIENT)
Dept: FAMILY MEDICINE | Facility: OTHER | Age: 57
End: 2022-01-28
Attending: FAMILY MEDICINE
Payer: COMMERCIAL

## 2022-01-28 ENCOUNTER — NURSE TRIAGE (OUTPATIENT)
Dept: FAMILY MEDICINE | Facility: OTHER | Age: 57
End: 2022-01-28
Payer: COMMERCIAL

## 2022-01-28 VITALS — WEIGHT: 262 LBS | BODY MASS INDEX: 34.57 KG/M2

## 2022-01-28 DIAGNOSIS — E11.9 TYPE 2 DIABETES MELLITUS WITHOUT COMPLICATION, WITHOUT LONG-TERM CURRENT USE OF INSULIN (H): ICD-10-CM

## 2022-01-28 DIAGNOSIS — R05.9 COUGH: ICD-10-CM

## 2022-01-28 DIAGNOSIS — U07.1 INFECTION DUE TO 2019 NOVEL CORONAVIRUS: Primary | ICD-10-CM

## 2022-01-28 PROCEDURE — 99213 OFFICE O/P EST LOW 20 MIN: CPT | Mod: 95 | Performed by: FAMILY MEDICINE

## 2022-01-28 RX ORDER — AZITHROMYCIN 250 MG/1
TABLET, FILM COATED ORAL
Qty: 6 TABLET | Refills: 0 | Status: SHIPPED | OUTPATIENT
Start: 2022-01-28 | End: 2022-02-02

## 2022-01-28 NOTE — NURSING NOTE
Pt has been having a cough, fatigue, headache, shortness of breath, and when he is out in the cold. He took an at home test yesterday and it came back positive. He noticed a few weeks ago his blood sugars have increased.      Medication Reconciliation: complete  Aniyah Evangelista LPN

## 2022-01-28 NOTE — TELEPHONE ENCOUNTER
S-(situation): Patient forwarded for triage per LPN, appointment today with Lamonte Pickett MD regarding COVID positive as of 01/27/2022 and trouble breathing when lying down    B-(background): Patient states is diabetic, not on file    A-(assessment): Patient states home test positive as of 01/27/2022. Other family members in household have been positive this week. Symptom onset of 1st of January, extreme sore throat and headache. Wife and son had RSV at that time, did not test for COVID. Patient now has progressed symptoms to dry cough to point of feeling like passing out with episodes. Symptoms of sore throat and headache remain. Describes not being able to sleep well in last 2-3 weeks due to coughing and feeling that there is some paranoia with breathing when lying down only. Cough is getting worse in last 2-4 days specifically and patient desires office visit for concern, possible chest x-ray. Patient does not feel he has temp, has not recently checked. O2 level on home monitor of 95 % currently. Patient states he is mostly concerned of symptoms exacerbating at night and not being able to sleep. Has not taken anything over the counter for cough. No history of smoking. Denies dizziness, chest pain, SOB at rest, rapid pulse, stiff neck, nausea or vomiting.     R-(recommendations): Discussed concern and question of office visit being most appropriate given symptoms described. Patient does not desire ED evaluation unless deemed most appropriate by provider. Will route notation to Lamonte Pickett MD for review. Telephone visit may be deemed more appropriate at this time. Patient verbalized understanding, no further questions or concerns.      Reason for Disposition    Cough present > 3 weeks    Additional Information    Negative: Fever > 103 F (39.4 C)    Negative: SEVERE or constant chest pain or pressure (Exception: mild central chest pain, present only when coughing)    Negative: MODERATE difficulty breathing  (e.g., speaks in phrases, SOB even at rest, pulse 100-120)    Negative: MILD difficulty breathing (e.g., minimal/no SOB at rest, SOB with walking, pulse <100)    Negative: Chest pain or pressure    Negative: SEVERE difficulty breathing (e.g., struggling for each breath, speaks in single words)    Protocols used: CORONAVIRUS (COVID-19) DIAGNOSED OR [id number] 8.25.2021    Sonia Butts RN ....................  1/28/2022   10:49 AM

## 2022-01-28 NOTE — PROGRESS NOTES
Bucky is a 56 year old who is being evaluated via a billable telephone visit.      Assessment & Plan     Type 2 diabetes mellitus without complication, without long-term current use of insulin (H)  Blood sugars have mildly elevated during his illness but suspect that these will improve once he gets through his illness.    Infection due to 2019 novel coronavirus  Tested positive for Covid yesterday.  He is worried about the possibility of bacterial superinfection.  He has had no fevers.  We will send in a prescription for azithromycin to be used.  Discussed that he is outside the window for oral antiviral treatment given his symptoms started at the beginning of the month.    Lamonte Pickett  [facility]    Subjective   Bucky is a 56 year old who presents for the following health issues    HPI   He developed a cough and sore throat around January 1.  He has had family members that have been ill throughout the month.  Yesterday he did a home test for Covid which came back positive.  He has had body aches and has felt feverish.  He was able to check his oxygen level earlier this morning and he was over 95%.  He has quite a bit of shortness of breath and cough especially he lays down.  He has a history of type 2 diabetes and reports his blood sugars have been elevated recently.    Review of Systems   Constitutional, HEENT, cardiovascular, pulmonary, gi and gu systems are negative, except as otherwise noted.      Objective           Vitals:  No vitals were obtained today due to virtual visit.    Physical Exam   healthy, alert and no distress  PSYCH: Alert and oriented times 3; coherent speech, normal   rate and volume, able to articulate logical thoughts, able   to abstract reason, no tangential thoughts, no hallucinations   or delusions  His affect is normal  RESP: No cough, no audible wheezing, able to talk in full sentences  Remainder of exam unable to be completed due to telephone visits           Phone call duration: 7 minutes

## 2022-03-07 ENCOUNTER — OFFICE VISIT (OUTPATIENT)
Dept: PEDIATRICS | Facility: OTHER | Age: 57
End: 2022-03-07
Attending: INTERNAL MEDICINE
Payer: COMMERCIAL

## 2022-03-07 VITALS
RESPIRATION RATE: 16 BRPM | HEART RATE: 58 BPM | OXYGEN SATURATION: 98 % | BODY MASS INDEX: 34.06 KG/M2 | DIASTOLIC BLOOD PRESSURE: 82 MMHG | WEIGHT: 257 LBS | SYSTOLIC BLOOD PRESSURE: 110 MMHG | HEIGHT: 73 IN | TEMPERATURE: 97.1 F

## 2022-03-07 DIAGNOSIS — E11.9 CONTROLLED TYPE 2 DIABETES MELLITUS WITHOUT COMPLICATION, WITHOUT LONG-TERM CURRENT USE OF INSULIN (H): ICD-10-CM

## 2022-03-07 DIAGNOSIS — Z12.5 SCREENING FOR PROSTATE CANCER: ICD-10-CM

## 2022-03-07 DIAGNOSIS — E78.5 HYPERLIPIDEMIA LDL GOAL <100: ICD-10-CM

## 2022-03-07 DIAGNOSIS — Z13.0 SCREENING, ANEMIA, DEFICIENCY, IRON: ICD-10-CM

## 2022-03-07 DIAGNOSIS — E66.09 NON MORBID OBESITY DUE TO EXCESS CALORIES: ICD-10-CM

## 2022-03-07 DIAGNOSIS — Z00.00 ANNUAL PHYSICAL EXAM: Primary | ICD-10-CM

## 2022-03-07 DIAGNOSIS — G47.00 INSOMNIA, UNSPECIFIED TYPE: ICD-10-CM

## 2022-03-07 LAB
ALBUMIN SERPL-MCNC: 4.5 G/DL (ref 3.5–5.7)
ALP SERPL-CCNC: 42 U/L (ref 34–104)
ALT SERPL W P-5'-P-CCNC: 26 U/L (ref 7–52)
ANION GAP SERPL CALCULATED.3IONS-SCNC: 5 MMOL/L (ref 3–14)
AST SERPL W P-5'-P-CCNC: 25 U/L (ref 13–39)
BASOPHILS # BLD AUTO: 0 10E3/UL (ref 0–0.2)
BASOPHILS NFR BLD AUTO: 1 %
BILIRUB SERPL-MCNC: 2.3 MG/DL (ref 0.3–1)
BUN SERPL-MCNC: 16 MG/DL (ref 7–25)
CALCIUM SERPL-MCNC: 9.3 MG/DL (ref 8.6–10.3)
CHLORIDE BLD-SCNC: 107 MMOL/L (ref 98–107)
CHOLEST SERPL-MCNC: 105 MG/DL
CO2 SERPL-SCNC: 26 MMOL/L (ref 21–31)
CREAT SERPL-MCNC: 1.15 MG/DL (ref 0.7–1.3)
EOSINOPHIL # BLD AUTO: 0.1 10E3/UL (ref 0–0.7)
EOSINOPHIL NFR BLD AUTO: 3 %
ERYTHROCYTE [DISTWIDTH] IN BLOOD BY AUTOMATED COUNT: 12.2 % (ref 10–15)
FASTING STATUS PATIENT QL REPORTED: NORMAL
GFR SERPL CREATININE-BSD FRML MDRD: 76 ML/MIN/1.73M2
GLUCOSE BLD-MCNC: 129 MG/DL (ref 70–105)
HBA1C MFR BLD: 7.4 % (ref 4–6.2)
HCT VFR BLD AUTO: 39.1 % (ref 40–53)
HDLC SERPL-MCNC: 44 MG/DL (ref 23–92)
HGB BLD-MCNC: 13.3 G/DL (ref 13.3–17.7)
IMM GRANULOCYTES # BLD: 0 10E3/UL
IMM GRANULOCYTES NFR BLD: 1 %
LDLC SERPL CALC-MCNC: 43 MG/DL
LYMPHOCYTES # BLD AUTO: 2 10E3/UL (ref 0.8–5.3)
LYMPHOCYTES NFR BLD AUTO: 39 %
MCH RBC QN AUTO: 29 PG (ref 26.5–33)
MCHC RBC AUTO-ENTMCNC: 34 G/DL (ref 31.5–36.5)
MCV RBC AUTO: 85 FL (ref 78–100)
MONOCYTES # BLD AUTO: 0.5 10E3/UL (ref 0–1.3)
MONOCYTES NFR BLD AUTO: 9 %
NEUTROPHILS # BLD AUTO: 2.4 10E3/UL (ref 1.6–8.3)
NEUTROPHILS NFR BLD AUTO: 47 %
NONHDLC SERPL-MCNC: 61 MG/DL
NRBC # BLD AUTO: 0 10E3/UL
NRBC BLD AUTO-RTO: 0 /100
PLATELET # BLD AUTO: 211 10E3/UL (ref 150–450)
POTASSIUM BLD-SCNC: 4.4 MMOL/L (ref 3.5–5.1)
PROT SERPL-MCNC: 6.8 G/DL (ref 6.4–8.9)
PSA SERPL-MCNC: 0.44 UG/L (ref 0–4)
RBC # BLD AUTO: 4.58 10E6/UL (ref 4.4–5.9)
SODIUM SERPL-SCNC: 138 MMOL/L (ref 134–144)
TRIGL SERPL-MCNC: 89 MG/DL
WBC # BLD AUTO: 5.1 10E3/UL (ref 4–11)

## 2022-03-07 PROCEDURE — 80061 LIPID PANEL: CPT | Mod: ZL | Performed by: INTERNAL MEDICINE

## 2022-03-07 PROCEDURE — 80053 COMPREHEN METABOLIC PANEL: CPT | Mod: ZL | Performed by: INTERNAL MEDICINE

## 2022-03-07 PROCEDURE — 83036 HEMOGLOBIN GLYCOSYLATED A1C: CPT | Mod: ZL | Performed by: INTERNAL MEDICINE

## 2022-03-07 PROCEDURE — G0103 PSA SCREENING: HCPCS | Mod: ZL | Performed by: INTERNAL MEDICINE

## 2022-03-07 PROCEDURE — 85025 COMPLETE CBC W/AUTO DIFF WBC: CPT | Mod: ZL | Performed by: INTERNAL MEDICINE

## 2022-03-07 PROCEDURE — 99396 PREV VISIT EST AGE 40-64: CPT | Performed by: INTERNAL MEDICINE

## 2022-03-07 PROCEDURE — 36415 COLL VENOUS BLD VENIPUNCTURE: CPT | Mod: ZL | Performed by: INTERNAL MEDICINE

## 2022-03-07 RX ORDER — TRAZODONE HYDROCHLORIDE 50 MG/1
50 TABLET, FILM COATED ORAL
Qty: 30 TABLET | Refills: 3 | Status: SHIPPED | OUTPATIENT
Start: 2022-03-07

## 2022-03-07 RX ORDER — LISINOPRIL 2.5 MG/1
2.5 TABLET ORAL DAILY
Qty: 90 TABLET | Refills: 3 | Status: SHIPPED | OUTPATIENT
Start: 2022-03-07

## 2022-03-07 RX ORDER — METFORMIN HCL 500 MG
2000 TABLET, EXTENDED RELEASE 24 HR ORAL AT BEDTIME
Qty: 360 TABLET | Refills: 3 | Status: SHIPPED | OUTPATIENT
Start: 2022-03-07

## 2022-03-07 RX ORDER — ROSUVASTATIN CALCIUM 5 MG/1
5 TABLET, COATED ORAL DAILY
Qty: 90 TABLET | Refills: 3 | Status: SHIPPED | OUTPATIENT
Start: 2022-03-07

## 2022-03-07 RX ORDER — GLIPIZIDE 2.5 MG/1
2.5 TABLET, EXTENDED RELEASE ORAL 2 TIMES DAILY
Qty: 180 TABLET | Refills: 3 | Status: SHIPPED | OUTPATIENT
Start: 2022-03-07

## 2022-03-07 ASSESSMENT — PAIN SCALES - GENERAL: PAINLEVEL: MODERATE PAIN (4)

## 2022-03-07 NOTE — PROGRESS NOTES
Assessment & Plan   1. Annual physical exam      2. Controlled type 2 diabetes mellitus without complication, without long-term current use of insulin (H)  At goal, no change.  - glipiZIDE (GLUCOTROL XL) 2.5 MG 24 hr tablet; Take 1 tablet (2.5 mg) by mouth 2 times daily  Dispense: 180 tablet; Refill: 3  - lisinopril (ZESTRIL) 2.5 MG tablet; Take 1 tablet (2.5 mg) by mouth daily  Dispense: 90 tablet; Refill: 3  - metFORMIN (GLUCOPHAGE-XR) 500 MG 24 hr tablet; Take 4 tablets (2,000 mg) by mouth At Bedtime  Dispense: 360 tablet; Refill: 3  - Hemoglobin A1c; Future  - Comprehensive Metabolic Panel; Future  - Hemoglobin A1c  - Comprehensive Metabolic Panel    3. Hyperlipidemia LDL goal <100  At goal, no change.  - rosuvastatin (CRESTOR) 5 MG tablet; Take 1 tablet (5 mg) by mouth daily  Dispense: 90 tablet; Refill: 3  - Lipid Panel; Future  - Lipid Panel    4. Non morbid obesity due to excess calories  I recommend weight loss    5. Insomnia, unspecified type  We discussed options and decided on a trial of trazodone.  I encouraged him to consider seeing a therapist.  I recommended sleep hygiene.  - traZODone (DESYREL) 50 MG tablet; Take 1 tablet (50 mg) by mouth nightly as needed for sleep  Dispense: 30 tablet; Refill: 3    6. Screening for prostate cancer  - PSA Screen GH; Future  - PSA Screen GH    7. Screening, anemia, deficiency, iron  - CBC with Platelets & Differential; Future  - CBC with Platelets & Differential      Patient Instructions         How to improve your sleep     -- Practice good sleep hygiene (see below)   -- CBT-I, therapy is the first line treatment   -- Can consider medications     -- Exercise daily   -- No naps (or at most 15 min power nap)   -- No caffeine after 3pm   -- No screens the hour before bed (eg TV, cell phone, tablets, E-readers, laptops, etc)   -- No TVs in bedroom   -- Keep the same bed time and wake time 7 days a week (set an alarm every morning)   -- Try trazodone, 0.5-1 hours before  bedtime   -- Relaxing routine before bed   -- Lay down in bed when tired, and go to sleep     [place] counselors/therapists   Telephone Hours Kids? Address   [facility]  (Many counselors) [phone] M-Th 8-5  F 8-12 Yes [address] [place] [address]   [email or web address]   [facility]  (Many counselors) [phone]  Yes [address]   [email or web address]://[email or web address]   [facility]  (Many counselors) [phone]  Yes [address]  [email or web address]://[email or web address]   [facility]  Ronaldo Benz [phone]  Yes [facility]  [address] [place] [address], [address]  [email or web address]://[email or web address]/   [facility]  Roger Rudd [phone]  Yes [address] [address] [facility] [address]   [address]  [email or web address]   Herminia Rose [phone]   [address]  [email or web address]   [facility]  Paulino Smith [phone]   [address] [address]  https://www.[facility] HospitalZazoo.com/   Edith Paco [phone]   [address]   Val Gomez [phone]   [address] [place] [address]   Alba Lucas [phone]  Yes [address]   Eula Eaton [phone]   [address] [facility]   Yovany Granda [phone]   [address] [place] [address]   Josselin Deanna [phone]   10   [address] [place] [address]   [email or web address]   Chantal Prasad [phone]   [address] [place] [address] [address]  ([facility])  [email or web address]   [facility]  Hakeem Acosta [phone]   [address] [facility]  Michele Rinne Cindy Thomas [phone]      [facility]: [place] [phone]  Yes GREG Ram  [address]  [email or web address]   [facility] [place] [phone]  Yes GREG Zhou  [address] [facility]  [email or web address]           Return in about 1 year (around  "3/7/2023), or if symptoms worsen or fail to improve, for physical.    Signed, Madhu Alfaro MD, FAAP, FACP  Internal Medicine & Pediatrics    Subjective   Bucky Chavez is a 56 year old male who presents for annual physical.  He has had some difficulty sleeping.  He wakes up in the middle the night and is not able to go back to sleep.  He wants to know if there is something he can use for an as needed medication.  He and his wife have recently adopted a baby who is currently 9 months old.    Objective   Vitals: /82   Pulse 58   Temp 97.1  F (36.2  C) (Tympanic)   Resp 16   Ht 1.845 m (6' 0.64\")   Wt 116.6 kg (257 lb)   SpO2 98%   BMI 34.25 kg/m      General: well appearing  Neck: No JVD, no bruits  CV: Regular rate and rhythm, no murmur, rub or gallop  Pulm: Clear to auscultation bilaterally, no wheezing, rales or rhonchi  Abd: Soft, non-tender, non-distended. No hepatosplenomegaly. No masses. Bowel sounds present  Neuro: Grossly intact  Musculoskeletal: No lower extremity edema  Skin: No rash  Psychiatry: Normal affect and insight.    Review and Analysis of Data   I personally reviewed the following:  External notes: No  Results: Yes Lab work from last year reviewed  Use of an independent historian: No  Independent review of a test performed by another physician: No  Discussion of management with another physician: No  Low risk of morbidity from additional diagnostic testing and/or treatment.    "

## 2022-03-07 NOTE — PATIENT INSTRUCTIONS
How to improve your sleep     -- Practice good sleep hygiene (see below)   -- CBT-I, therapy is the first line treatment   -- Can consider medications     -- Exercise daily   -- No naps (or at most 15 min power nap)   -- No caffeine after 3pm   -- No screens the hour before bed (eg TV, cell phone, tablets, E-readers, laptops, etc)   -- No TVs in bedroom   -- Keep the same bed time and wake time 7 days a week (set an alarm every morning)   -- Try trazodone, 0.5-1 hours before bedtime   -- Relaxing routine before bed   -- Lay down in bed when tired, and go to sleep     [place] counselors/therapists   Telephone Hours Kids? Address   [facility]  (Many counselors) [phone] M-Th 8-5  F 8-12 Yes [address] [place] [address]   [email or web address]://[email or web address].[email or web address]   [facility]  (Many counselors) [phone]  Yes [address] [facility] [address] [place]   [email or web address]://[email or web address]   [facility]  (Many counselors) [phone]  Yes [facility] [place]  [email or web address]://[email or web address]   [facility]  Ronaldo Benz [phone]  Yes [facility]  [address]  http://[email or web address]   [facility]  Roger Rudd [phone]  Yes [address]   [address]  [email or web address]   Herminia Rose [phone]   [address]  [email or web address]   [facility]  Paulino Smith [phone]   [address] [facility] [address]  [email or web address]   Edith Antonio [phone]   [address]   Val Gomez [phone]   [address] [place] [address]   Alba Lucas [phone]  Yes [address]   Eula Eaton [phone]   [address] [facility] Jesus Alberto [phone]   [address] [place] [address]   Josselin Huerta [phone]   10   [address] [place] [address]   [email or web address]://[email or web address].[facility]counseling.[email or web address] Diagnosticswestoffice.[email or web address]   Chantal Prasad [phone]   [address] [place] [address]  7  ([facility])  [email or web address]   [facility]  Hakeem Acosta [phone]   [address] [place] [address]  Michele Rinne Cindy Thomas [phone]      [facility]: Yo [phone]  Yes GREG Ram  [address]  [email or web address]://[email or web address]   [facility] [place] [phone]  Yes GREG Zhou  [address] [place] [address]  [email or web address]://[email or web address]

## 2022-03-07 NOTE — NURSING NOTE
Patient is here for physical and diabetic check.     Medication Reconciliation: complete    FOOD SECURITY SCREENING QUESTIONS  Hunger Vital Signs:  Within the past 12 months we worried whether our food would run out before we got money to buy more. Never  Within the past 12 months the food we bought just didn't last and we didn't have money to get more. Never  Adele Latham LPN 3/7/2022 8:03 AM       Advance care directive on file? no  Advance care directive provided to patient? no       Adele Latham LPN

## 2022-08-17 ENCOUNTER — TELEPHONE (OUTPATIENT)
Dept: EDUCATION SERVICES | Facility: OTHER | Age: 57
End: 2022-08-17

## 2022-08-17 DIAGNOSIS — E11.65 UNCONTROLLED TYPE 2 DIABETES MELLITUS WITH HYPERGLYCEMIA (H): Primary | ICD-10-CM

## 2022-08-17 NOTE — TELEPHONE ENCOUNTER
Patient requests to update his FreeStyle Maryann 14-day sensor to FreeStyle Maryann 2 CGM for glucose monitoring.  Patient uses his phone for [place], just needs sensors.  Patient is NOT on insulin, unsure if [employer] insurance will cover new system.      1.  Patient has diabetes:  YES  2.  Patient is utilizing an insulin pump or administers 1 or more injections of insulin per day:  NO       If accepted as written, please sign pending order.     Thank you,     Sandra Damon RN, BSN, [facility]  8/17/2022 4:49 PM